# Patient Record
Sex: MALE | Race: WHITE | NOT HISPANIC OR LATINO | URBAN - NONMETROPOLITAN AREA
[De-identification: names, ages, dates, MRNs, and addresses within clinical notes are randomized per-mention and may not be internally consistent; named-entity substitution may affect disease eponyms.]

---

## 2017-04-10 LAB — HCV AB SER-ACNC: NEGATIVE

## 2019-12-31 ENCOUNTER — HISTORICAL (OUTPATIENT)
Dept: ADMINISTRATIVE | Facility: HOSPITAL | Age: 54
End: 2019-12-31

## 2020-01-02 ENCOUNTER — HISTORICAL (OUTPATIENT)
Dept: ADMINISTRATIVE | Facility: HOSPITAL | Age: 55
End: 2020-01-02

## 2020-01-07 ENCOUNTER — HISTORICAL (OUTPATIENT)
Dept: ADMINISTRATIVE | Facility: HOSPITAL | Age: 55
End: 2020-01-07

## 2020-01-08 ENCOUNTER — HISTORICAL (OUTPATIENT)
Dept: ADMINISTRATIVE | Facility: HOSPITAL | Age: 55
End: 2020-01-08

## 2020-01-09 ENCOUNTER — HISTORICAL (OUTPATIENT)
Dept: ADMINISTRATIVE | Facility: HOSPITAL | Age: 55
End: 2020-01-09

## 2020-01-14 ENCOUNTER — HISTORICAL (OUTPATIENT)
Dept: ADMINISTRATIVE | Facility: HOSPITAL | Age: 55
End: 2020-01-14

## 2020-01-21 ENCOUNTER — HISTORICAL (OUTPATIENT)
Dept: ADMINISTRATIVE | Facility: HOSPITAL | Age: 55
End: 2020-01-21

## 2020-01-24 LAB — CRC RECOMMENDATION EXT: NORMAL

## 2020-02-16 ENCOUNTER — HISTORICAL (OUTPATIENT)
Dept: ADMINISTRATIVE | Facility: HOSPITAL | Age: 55
End: 2020-02-16

## 2020-11-28 ENCOUNTER — HISTORICAL (OUTPATIENT)
Dept: ADMINISTRATIVE | Facility: HOSPITAL | Age: 55
End: 2020-11-28

## 2021-01-03 ENCOUNTER — HISTORICAL (OUTPATIENT)
Dept: ADMINISTRATIVE | Facility: HOSPITAL | Age: 56
End: 2021-01-03

## 2021-01-03 LAB
CALCIUM SERPL-MCNC: 9.4 MG/DL (ref 8.5–10.1)
CHLORIDE SERPL-SCNC: 106 MMOL/L (ref 98–107)
CO2 SERPL-SCNC: 27 MMOL/L (ref 21–32)
CREAT SERPL-MCNC: 0.7 MG/DL (ref 0.6–1.3)
GLUCOSE SERPL-MCNC: 102 MG/DL (ref 74–106)
POTASSIUM SERPL-SCNC: 3.4 MMOL/L (ref 3.5–5.1)
SODIUM SERPL-SCNC: 144 MEQ/L (ref 131–145)

## 2021-11-01 LAB
ALBUMIN SERPL-MCNC: 4.1 G/DL (ref 3.4–5)
ALBUMIN/GLOB SERPL: 1.6 {RATIO}
ALP SERPL-CCNC: 63 U/L (ref 50–144)
ALT SERPL-CCNC: 16 U/L (ref 1–45)
AMYLASE SERPL-CCNC: 62 U/L (ref 30–110)
ANION GAP SERPL CALC-SCNC: 6 MMOL/L (ref 7–16)
AST SERPL-CCNC: 22 U/L (ref 17–59)
BASOPHILS # BLD AUTO: 0.08 10*3/UL (ref 0.01–0.08)
BASOPHILS NFR BLD AUTO: 1.2 % (ref 0.1–1.2)
BILIRUB SERPL-MCNC: 0.21 MG/DL (ref 0–1)
BUN SERPL-MCNC: 6 MG/DL (ref 7–20)
CALCIUM SERPL-MCNC: 9.5 MG/DL (ref 8.4–10.2)
CHLORIDE SERPL-SCNC: 114 MMOL/L (ref 94–110)
CHOLEST SERPL-MCNC: 132 MG/DL (ref 0–200)
CO2 SERPL-SCNC: 23 MMOL/L (ref 21–32)
CREAT SERPL-MCNC: 0.76 MG/DL (ref 0.66–1.25)
CREAT/UREA NIT SERPL: 7.9 (ref 12–20)
DEPRECATED CALCIDIOL+CALCIFEROL SERPL-MC: 38 NG/ML (ref 30–100)
EOSINOPHIL # BLD AUTO: 0.33 10*3/UL (ref 0.04–0.54)
EOSINOPHIL NFR BLD AUTO: 5.1 % (ref 0.7–7)
ERYTHROCYTE [DISTWIDTH] IN BLOOD BY AUTOMATED COUNT: 13.3 % (ref 11.6–14.4)
EST CREAT CLEARANCE SER (OHS): 155.4 ML/MIN
GLOBULIN SER-MCNC: 2.6 G/DL (ref 2–3.9)
GLUCOSE SERPL-MCNC: 90 MGM./DL (ref 70–115)
HCT VFR BLD AUTO: 35.7 % (ref 36–52)
HDLC SERPL-MCNC: 36 MG/DL (ref 40–60)
HGB BLD-MCNC: 12.4 G/DL (ref 13–18)
IMM GRANULOCYTES # BLD AUTO: 0.05 10*3/UL (ref 0–0.03)
IMM GRANULOCYTES NFR BLD AUTO: 0.8 % (ref 0–0.5)
LDLC SERPL CALC-MCNC: 63.8 MG/DL (ref 30–100)
LIPASE SERPL-CCNC: 295 U/L (ref 23–300)
LYMPHOCYTES # BLD AUTO: 2.04 10*3/UL (ref 1.32–3.57)
LYMPHOCYTES NFR BLD AUTO: 31.6 % (ref 20–55)
MCH RBC QN AUTO: 31.7 PG (ref 27–34)
MCHC RBC AUTO-ENTMCNC: 34.7 G/DL (ref 31–37)
MCV RBC AUTO: 91.3 FL (ref 79–99)
MONOCYTES # BLD AUTO: 0.51 10*3/UL (ref 0.3–0.82)
MONOCYTES NFR BLD AUTO: 7.9 % (ref 4.7–12.5)
NEUTROPHILS # BLD AUTO: 3.44 10*3/UL (ref 1.78–5.38)
NEUTROPHILS NFR BLD AUTO: 53.4 % (ref 37–73)
PLATELET # BLD AUTO: 268 10*3/UL (ref 140–371)
PMV BLD AUTO: 10.4 FL (ref 9.4–12.4)
POTASSIUM SERPL-SCNC: 3.6 MMOL/L (ref 3.5–5.1)
PROT SERPL-MCNC: 6.7 G/DL (ref 6.3–8.2)
RBC # BLD AUTO: 3.91 10*6/UL (ref 4–6)
SODIUM SERPL-SCNC: 143 MMOL/L (ref 135–145)
TRIGL SERPL-MCNC: 146 MG/DL (ref 30–200)
WBC # SPEC AUTO: 6.5 10*3/UL (ref 4–11.5)

## 2022-04-07 ENCOUNTER — HISTORICAL (OUTPATIENT)
Dept: ADMINISTRATIVE | Facility: HOSPITAL | Age: 57
End: 2022-04-07

## 2022-04-24 VITALS
DIASTOLIC BLOOD PRESSURE: 70 MMHG | BODY MASS INDEX: 32.64 KG/M2 | SYSTOLIC BLOOD PRESSURE: 126 MMHG | HEIGHT: 70 IN | OXYGEN SATURATION: 97 % | WEIGHT: 228 LBS

## 2022-04-27 ENCOUNTER — HISTORICAL (OUTPATIENT)
Dept: ADMINISTRATIVE | Facility: HOSPITAL | Age: 57
End: 2022-04-27

## 2022-05-01 NOTE — HISTORICAL [facility] CERNER
This is a historical note converted from [employer]. Formatting and pictures may have been removed.  Please reference [employer] for original formatting and attached multimedia. Chief Complaint  1 mth f/u on seizures. last seizure 1 month ago.abdominal pain, f/u ER  History of Present Illness  The patient presents with complaint of severe right lower abdominal pain. By report normal visit. No seizure since last visit. Taking all medication except miralax. Havent taken in several months.? Pain resolved after bowel movement in office.  Review of Systems  Constitutional:No fever, no chills, no sweats, no weakness, no fatigue.  Eye:No recent visual problems, no blurry vision, no visual disturbances.  ENT:No ear pain, no change in hearing, no nasal congestion, no sore throat  Respiratory:No shortness of breath, no cough, no wheezing.  Cardiovascular:No chest pain, no palpitations, no peripheral edema.  Gastrointestinal:Negative except HPI.  Genitourinary:No dysuria, no hematuria.?_?  Immunologic:No recurrent fevers, no malaise  Hematology/lymphatics:No swollen lymph glands.  Musculoskeletal:Negative except HPI.  Integumentary:No rashes, no skin lesions.  Neurologic:Negative except HPI.  Psychiatric:No anxiety, no depression, no sleeping problems?  Physical Exam  Vitals & Measurements  T:?37.0? ?C (Temporal Artery)? HR:?73(Peripheral)? RR:?20? BP:?124/64?  HT:?177.80?cm? WT:?97.970?kg? BMI:?30.99?  Constitutional: General Appearance: healthy-appearing, well-nourished, and well-developed. Level of Distress: NAD. Ambulation: ambulating normally.  ?   Psychiatric: Insight: good judgement. Mental Status: normal mood and affect and active and alert. Orientation: to time, place, and person. Memory: recent memory normal and remote memory normal.  ?   Head: Head: normocephalic and with evidence of injury (chronic).  ?   Eyes: Lids and Conjunctivae: non-injected and no discharge. Pupils: PERRLA. EOM: EOMI.  ?   ENMT: Ears: no lesions on  external ear, EACs clear, and TMs clear. Hearing: no hearing loss and Conversational Hearing Normal. Nose: no lesions on external nose, septal deviation, sinus tenderness, or nasal discharge and nares patent and nasal passages clear. Lips, Teeth, and Gums: no mouth or lip ulcers or bleeding gums and normal dentition. Oropharynx: no erythema or exudates and moist mucous membranes and tonsils not enlarged.  ?   Neck: Neck: supple, FROM, trachea midline, and no masses. Lymph Nodes: no cervical LAD, supraclavicular LAD, axillary LAD, or inguinal LAD. Thyroid: no enlargement or nodules and non-tender.  ?   Lungs: Percussion: no dullness, flatness, or hyperresonance. Auscultation: no wheezing, rales/crackles, or rhonchi and breath sounds normal, good air movement, and CTA except as noted.  ?   Cardiovascular: Heart Auscultation: normal S1 and S2 and RRR and no murmurs. Neck vessels: no carotid bruits. Pulses strong / equal.  ?  Abdomen: Bowel Sounds: hyperactive. Inspection and Palpation: slightly distended with increased tympany and right lower fullness with slight tenderness, guarding, masses, rebound tenderness, or CVA tenderness and soft and non-distended. Liver: non-tender and no hepatomegaly. Spleen: non-tender and no splenomegaly. Hernia: none palpable.  ?  Musculoskeletal:: Motor Strength and Tone: abnormal motor strength and hypertonicity (right lateral). Joints, Bones, and Muscles: no contractures, tenderness, or bony abnormalities and malalignment and limited ROM (right hemiparesis). Extremities: no cyanosis, edema, varicosities, or palpable cord. Lumbar / Lumbosacral Spine lumbar paravertebral normal.  ?  Neurologic: Gait and Station: wide based, limping gait and station. Sensation: grossly intact. Reflexes: DTRs 2+ bilaterally throughout. Coordination and Cerebellum: no tremor.  ?  Skin: Inspection and palpation: no rash, lesions, ulcer, induration, nodules, jaundice, or abnormal nevi and good turgor. Nails:  normal.  Assessment/Plan  1.?Constipation in male ? K59.00  2.?Abdominal pain, RLQ ? R10.31  3.?Allergic rhinitis ? J30.9  4.?Essential hypertension ? I10  5.?Gastroenteritis ? K52.9  6.?Hemiplegia and/or hemiparesis following stroke ? I69.359  7.?Hyperlipidemia ? E78.5  8.?Chronic constipation ? K59.09  9.?Nicotine dependence ? F17.200  10.?Seizure disorder ? R56.9  Orders:  polyethylene glycol 3350 WITH ELECTROLYTES, See Instructions, one capful daily., # 1 EA, 0 Refill(s), Pharmacy: [email or web address], 177.8, cm, Height/Length Dosing, 01/04/21 9:14:00 CST, 97.97, kg, Weight Dosing, 01/04/21 9:14:00 CST  Clinic Follow up, *Est. 02/01/21 3:00:00 CST, Order for future visit, Constipation in male  Abdominal pain, RLQ  Allergic rhinitis  Essential hypertension  Gastroenteritis  Hemiplegia and/or hemiparesis following stroke  Hyperlipidemia  Chronic constipation  Ni...  Office/Outpatient Visit Level 3 Established 66335-02 PC, Constipation in male  Abdominal pain, RLQ  Allergic rhinitis  Essential hypertension  Gastroenteritis  Hemiplegia and/or hemiparesis following stroke  Hyperlipidemia  Chronic constipation  Nicotine dependence  Seizure disorder, JANATALIE Mclean Me...  Return in one month for follow up with abdominal pain with daily miralax. May need to increase. Will notify with lab results. Reviewed lab and diagnostic from ER visit.  Referrals  Clinic Follow up, *Est. 02/01/21 3:00:00 CST, Order for future visit, Constipation in male  Abdominal pain, RLQ  Allergic rhinitis  Essential hypertension  Gastroenteritis  Hemiplegia and/or hemiparesis following stroke  Hyperlipidemia  Chronic constipation  Ni...  Clinic Follow up, *Est. 02/01/21 3:00:00 CST, Order for future visit, Constipation in male  Abdominal pain, RLQ  Allergic rhinitis  Essential hypertension  Gastroenteritis  Hemiplegia and/or hemiparesis following stroke  Hyperlipidemia  Chronic constipation  Ni...   Problem  List/Past Medical History  Ongoing  Alcohol dependence  Allergic rhinitis  Arthropathy  Chronic constipation  COPD - Chronic obstructive pulmonary disease  Coronary atherosclerosis  CVA - Cerebrovascular accident  Depressive disorder  Erosive esophagitis  Essential hypertension  Gastroenteritis  GERD - Gastro-esophageal reflux disease  Hemiplegia and/or hemiparesis following stroke  History of iron deficiency  Hyperlipidemia  Hypokalemia  Impaired fasting glycaemia  Long-term drug therapy  Mild chronic obstructive pulmonary disease  Mixed anxiety and depressive disorder  Neuralgia  Nicotine dependence  Obstructive sleep apnea syndrome  Seizure disorder  Viral hepatitis C  Vitamin B12 deficiency  Vitamin D deficiency  Historical  Abdominal pain  Chronic gastritis  Procedure/Surgical History  Laparoscopic cholecystectomy (02/06/2020)  Colonoscopy (01/24/2020)  EGD w/Bx (01/24/2020)  crainotomy (01/01/1994)   Medications  albuterol 90 mcg/inh inhalation aerosol, See Instructions  amlodipine-benazepril 10 mg-20 mg oral capsule, 1 cap(s), Oral, Daily, 1 refills  Anoro Ellipta 62.5 mcg-25 mcg/inh inhalation powder, 1 puff(s), INH, Daily, 1 refills  Astelin 137 mcg/inh nasal spray, 1 spray(s), Nasal, BID, 2 refills  B-12 1000 mcg sublingual tablet, See Instructions  celecoxib 200 mg oral capsule, See Instructions  cetirizine 10 mg oral tablet, 10 mg= 1 tab(s), Oral, Daily, 1 refills  clopidogrel 75 mg oral tablet, See Instructions  Dexilant 60 mg oral delayed release capsule, See Instructions  escitalopram 20 mg oral tablet, See Instructions  fluticasone 50 mcg/inh nasal spray, See Instructions  gabapentin 400 mg oral capsule, See Instructions  levetiracetam 500 mg oral tablet, 1000 mg= 2 tab(s), Oral, BID, 1 refills  polyethylene glycol 3350 with electrolytes oral powder for reconstitution, See Instructions  rosuvastatin 10 mg oral tablet, See Instructions  Vascepa 1 g oral capsule, 2 gm= 2 cap(s), Oral, BID, 1  refills  Vitamin B Complex with C and Folic Acid oral capsule, 1 cap(s), Oral, Daily  Vitamin B12 with Folic Acid sublingual tablet, 1 tab(s), Oral, Daily  Allergies  No Known Allergies  No Known Medication Allergies  Social History  Abuse/Neglect  No, No, Yes, 01/04/2021  Alcohol  Current, Beer, Daily, Alcohol use interferes with work or home: No. Others hurt by drinking: No. Household alcohol concerns: No., 12/01/2020  Employment/School  Disabled, Highest education level: High school., 01/15/2020  Exercise  Exercise duration: 15. Exercise frequency: Daily. Exercise type: Walking., 01/15/2020  Home/Environment  Lives with Father, Mother. Living situation: Home with assistance. TV/Computer concerns: No. Mobile home, 01/15/2020  Nutrition/Health  Regular, Good, 01/15/2020  Sexual  Sexually active: No. Number of current partners 0. Sexual orientation: Straight or heterosexual. Gender Identity Identifies as male. No, 01/15/2020  Spiritual/Cultural  Alevism, No, 01/15/2020  Substance Use  Never, Drug use interferes with work/home: No. Household substance abuse concerns: No., 12/01/2020  Tobacco  10 or more cigarettes (1/2 pack or more)/day in last 30 days, No, 17 Years (Age started)., 01/04/2021  Family History  Family history is negative  Immunizations  Vaccine Date Status   influenza virus vaccine, inactivated 10/21/2020 Given   influenza virus vaccine, inactivated 09/24/2019 Recorded   influenza virus vaccine, inactivated 11/07/2018 Recorded   zoster vaccine live 02/06/2018 Recorded   tetanus/diphtheria/pertussis, acel(Tdap) 02/06/2018 Recorded   pneumococcal 13-valent conjugate vaccine 11/06/2017 Recorded   influenza virus vaccine, inactivated 11/06/2017 Recorded   influenza virus vaccine, inactivated 09/28/2016 Recorded   influenza virus vaccine, inactivated 11/10/2015 Recorded   influenza virus vaccine, inactivated 11/04/2014 Recorded   influenza virus vaccine, inactivated 10/01/2013 Recorded   influenza virus  vaccine, inactivated 11/01/2011 Recorded   influenza virus vaccine, inactivated 09/27/2010 Recorded   Health Maintenance  Health Maintenance  ???Pending?(in the next year)  ??? ??OverDue  ??? ? ? ?Coronary Artery Disease Maintenance-BMP due??02/10/17??and every 1??year(s)  ??? ? ? ?Smoking Cessation due??01/01/21??Variable frequency  ??? ??Due?  ??? ? ? ?Alcohol Misuse Screening due??01/02/21??and every 1??year(s)  ??? ? ? ?ADL Screening due??01/04/21??and every 1??year(s)  ??? ? ? ?Aspirin Therapy for CVD Prevention due??01/04/21??and every 1??year(s)  ??? ? ? ?COPD Maintenance-Pulmonary Rehab Education due??01/04/21??and every 1??year(s)  ??? ? ? ?COPD Maintenance-Spirometry due??01/04/21??Unknown Frequency  ??? ? ? ?COPD Management-Oxygen Assessment due??01/04/21??and every 1??year(s)  ??? ? ? ?Coronary Artery Disease Maintenance-Electrocardiogram due??01/04/21??Variable frequency  ??? ? ? ?Hypertension Maintenance-Medication Prescribed due??01/04/21??and every 1??year(s)  ??? ? ? ?Hypertension Management-Education due??01/04/21??and every 1??year(s)  ??? ? ? ?Medicare Annual Wellness Exam due??01/04/21??and every 1??year(s)  ??? ? ? ?Zoster Vaccine due??01/04/21??Unknown Frequency  ??? ??Due In Future?  ??? ? ? ?Hypertension Management-BMP not due until??02/15/21??and every 1??year(s)  ??? ? ? ?Depression Screening not due until??02/25/21??and every 1??year(s)  ??? ? ? ?COPD Management-COPD Medications Prescribed not due until??09/03/21??and every 1??year(s)  ??? ? ? ?Influenza Vaccine not due until??10/01/21??and every 1??day(s)  ??? ? ? ?Obesity Screening not due until??01/01/22??and every 1??year(s)  ???Satisfied?(in the past 1 year)  ??? ??Satisfied?  ??? ? ? ?Blood Pressure Screening on??01/04/21.??Satisfied by Nola Sotelo  ??? ? ? ?Body Mass Index Check on??01/04/21.??Satisfied by Nola Sotelo  ??? ? ? ?COPD Management-COPD Medications Prescribed on??09/03/20.??Satisfied by Vanessa Kincaid NP  GRZEGORZ  ??? ? ? ?Colorectal Screening on??01/24/20.??Satisfied by Roxann Quarles  ??? ? ? ?Coronary Artery Disease Maintenance-Antiplatelet Agent Prescribed on??09/15/20.??Satisfied by Vanessa Kincaid NP  ??? ? ? ?Coronary Artery Disease Maintenance-Lipid Lowering Therapy on??09/03/20.??Satisfied by Vanessa Kincaid NP  ??? ? ? ?Depression Screening on??02/26/20.??Satisfied by Nina Hooks LPN  ??? ? ? ?Hypertension Management-Blood Pressure on??01/04/21.??Satisfied by Nola Sotelo  ??? ? ? ?Influenza Vaccine on??10/21/20.??Satisfied by Naheed Rodriguez  ??? ? ? ?Obesity Screening on??01/04/21.??Satisfied by Nola Sotelo  ?

## 2023-01-25 DIAGNOSIS — G40.409 OTHER GENERALIZED EPILEPSY, NOT INTRACTABLE, WITHOUT STATUS EPILEPTICUS: Primary | ICD-10-CM

## 2023-01-25 RX ORDER — LEVETIRACETAM 500 MG/1
2 TABLET ORAL 2 TIMES DAILY
COMMUNITY
Start: 2022-10-31 | End: 2023-01-25 | Stop reason: SDUPTHER

## 2023-01-25 RX ORDER — LEVETIRACETAM 500 MG/1
1000 TABLET ORAL 2 TIMES DAILY
Qty: 120 TABLET | Refills: 3 | Status: SHIPPED | OUTPATIENT
Start: 2023-01-25 | End: 2023-05-01 | Stop reason: SDUPTHER

## 2023-01-30 ENCOUNTER — TELEPHONE (OUTPATIENT)
Dept: FAMILY MEDICINE | Facility: CLINIC | Age: 58
End: 2023-01-30
Payer: MEDICARE

## 2023-01-30 NOTE — TELEPHONE ENCOUNTER
----- Message from Pebbles Garcia sent at 1/30/2023  9:09 AM CST -----  Regarding: still no med refill      Dad called to say that [facility] still has not gotten the pts Anora called in.    Please advise his father

## 2023-03-06 DIAGNOSIS — E78.5 HYPERLIPIDEMIA, UNSPECIFIED HYPERLIPIDEMIA TYPE: ICD-10-CM

## 2023-03-06 DIAGNOSIS — J30.2 SEASONAL ALLERGIES: ICD-10-CM

## 2023-03-06 DIAGNOSIS — R52 PAIN: Primary | ICD-10-CM

## 2023-03-06 RX ORDER — CETIRIZINE HYDROCHLORIDE 10 MG/1
TABLET ORAL
Qty: 90 TABLET | Refills: 0 | Status: SHIPPED | OUTPATIENT
Start: 2023-03-06 | End: 2023-05-01 | Stop reason: SDUPTHER

## 2023-03-06 RX ORDER — ICOSAPENT ETHYL 1000 MG/1
CAPSULE ORAL
Qty: 360 CAPSULE | Refills: 0 | Status: SHIPPED | OUTPATIENT
Start: 2023-03-06 | End: 2023-05-01 | Stop reason: SDUPTHER

## 2023-03-06 RX ORDER — CETIRIZINE HYDROCHLORIDE 10 MG/1
TABLET ORAL
COMMUNITY
Start: 2022-03-02 | End: 2023-03-06 | Stop reason: SDUPTHER

## 2023-03-06 RX ORDER — CELECOXIB 200 MG/1
CAPSULE ORAL
Qty: 30 CAPSULE | Refills: 2 | Status: SHIPPED | OUTPATIENT
Start: 2023-03-06 | End: 2023-05-01 | Stop reason: SDUPTHER

## 2023-03-06 RX ORDER — ICOSAPENT ETHYL 1000 MG/1
CAPSULE ORAL
COMMUNITY
Start: 2022-06-29 | End: 2023-03-06 | Stop reason: SDUPTHER

## 2023-03-06 NOTE — TELEPHONE ENCOUNTER
----- Message from Lucas Feliciano MA sent at 3/6/2023  8:45 AM CST -----  Regarding: Med Refill  Vanessa    Vascepa 1gm       Cetirizine  10mg         [facility] [address]

## 2023-03-13 DIAGNOSIS — J30.9 ALLERGIC RHINITIS, UNSPECIFIED SEASONALITY, UNSPECIFIED TRIGGER: ICD-10-CM

## 2023-03-13 DIAGNOSIS — K59.00 CONSTIPATION, UNSPECIFIED CONSTIPATION TYPE: ICD-10-CM

## 2023-03-13 DIAGNOSIS — F32.A DEPRESSION, UNSPECIFIED DEPRESSION TYPE: Primary | ICD-10-CM

## 2023-03-13 RX ORDER — FLUTICASONE PROPIONATE 50 MCG
SPRAY, SUSPENSION (ML) NASAL
Qty: 48 G | Refills: 1 | Status: SHIPPED | OUTPATIENT
Start: 2023-03-13

## 2023-03-13 RX ORDER — ESCITALOPRAM OXALATE 20 MG/1
TABLET ORAL
Qty: 90 TABLET | Refills: 1 | Status: SHIPPED | OUTPATIENT
Start: 2023-03-13 | End: 2023-05-01 | Stop reason: SDUPTHER

## 2023-03-13 NOTE — TELEPHONE ENCOUNTER
----- Message from Pebbles Garcia sent at 3/13/2023  2:23 PM CDT -----  Regarding: Med refill      Dad called to get patients Veronica Delgadomcg refilled at [facility]

## 2023-03-22 ENCOUNTER — TELEPHONE (OUTPATIENT)
Dept: FAMILY MEDICINE | Facility: CLINIC | Age: 58
End: 2023-03-22
Payer: MEDICARE

## 2023-03-22 DIAGNOSIS — M79.2 NERVE PAIN: ICD-10-CM

## 2023-03-22 RX ORDER — GABAPENTIN 400 MG/1
CAPSULE ORAL
Qty: 90 CAPSULE | Refills: 1 | Status: SHIPPED | OUTPATIENT
Start: 2023-03-22 | End: 2023-05-01 | Stop reason: SDUPTHER

## 2023-03-22 NOTE — TELEPHONE ENCOUNTER
----- Message from Lucas Feliciano MA sent at 3/22/2023  9:23 AM CDT -----  Regarding: Med Refill  Vanessa        Gabapentin 400 mg      [facility]

## 2023-04-03 DIAGNOSIS — E87.6 HYPOKALEMIA: Primary | ICD-10-CM

## 2023-04-03 RX ORDER — POTASSIUM CHLORIDE 750 MG/1
10 TABLET, EXTENDED RELEASE ORAL DAILY
Qty: 90 TABLET | Refills: 0 | Status: SHIPPED | OUTPATIENT
Start: 2023-04-03 | End: 2023-05-01 | Stop reason: SDUPTHER

## 2023-04-03 RX ORDER — POTASSIUM CHLORIDE 750 MG/1
1 TABLET, EXTENDED RELEASE ORAL DAILY
COMMUNITY
Start: 2023-01-11 | End: 2023-04-03 | Stop reason: SDUPTHER

## 2023-04-17 PROBLEM — K86.81: Status: ACTIVE | Noted: 2023-04-17

## 2023-04-17 PROBLEM — K22.70 BARRETT'S ESOPHAGUS WITHOUT DYSPLASIA: Status: ACTIVE | Noted: 2023-04-17

## 2023-04-24 PROCEDURE — 85025 COMPLETE CBC W/AUTO DIFF WBC: CPT | Performed by: NURSE PRACTITIONER

## 2023-04-24 PROCEDURE — 80053 COMPREHEN METABOLIC PANEL: CPT | Performed by: NURSE PRACTITIONER

## 2023-04-24 PROCEDURE — 80177 DRUG SCRN QUAN LEVETIRACETAM: CPT | Mod: 90 | Performed by: NURSE PRACTITIONER

## 2023-04-26 RX ORDER — ROSUVASTATIN CALCIUM 10 MG/1
TABLET, COATED ORAL
COMMUNITY
Start: 2022-01-18 | End: 2023-05-01 | Stop reason: SDUPTHER

## 2023-04-27 ENCOUNTER — DOCUMENTATION ONLY (OUTPATIENT)
Dept: FAMILY MEDICINE | Facility: CLINIC | Age: 58
End: 2023-04-27
Payer: MEDICARE

## 2023-05-01 ENCOUNTER — OFFICE VISIT (OUTPATIENT)
Dept: FAMILY MEDICINE | Facility: CLINIC | Age: 58
End: 2023-05-01
Payer: MEDICARE

## 2023-05-01 DIAGNOSIS — I10 BENIGN ESSENTIAL HTN: ICD-10-CM

## 2023-05-01 DIAGNOSIS — Z87.820 HISTORY OF TRAUMATIC BRAIN INJURY: ICD-10-CM

## 2023-05-01 DIAGNOSIS — J44.9 MILD CHRONIC OBSTRUCTIVE PULMONARY DISEASE: ICD-10-CM

## 2023-05-01 DIAGNOSIS — K59.00 CONSTIPATION, UNSPECIFIED CONSTIPATION TYPE: ICD-10-CM

## 2023-05-01 DIAGNOSIS — G40.909 SEIZURE DISORDER: ICD-10-CM

## 2023-05-01 DIAGNOSIS — F32.A DEPRESSION, UNSPECIFIED DEPRESSION TYPE: ICD-10-CM

## 2023-05-01 DIAGNOSIS — E87.6 HYPOKALEMIA: ICD-10-CM

## 2023-05-01 DIAGNOSIS — Z81.1 FAMILY HISTORY OF ALCOHOL ABUSE: ICD-10-CM

## 2023-05-01 DIAGNOSIS — E78.5 HYPERLIPIDEMIA, UNSPECIFIED HYPERLIPIDEMIA TYPE: ICD-10-CM

## 2023-05-01 DIAGNOSIS — K59.09 CHRONIC CONSTIPATION: ICD-10-CM

## 2023-05-01 DIAGNOSIS — M79.2 NERVE PAIN: ICD-10-CM

## 2023-05-01 DIAGNOSIS — J30.2 SEASONAL ALLERGIES: ICD-10-CM

## 2023-05-01 DIAGNOSIS — G81.90 HEMIPLEGIA, UNSPECIFIED ETIOLOGY, UNSPECIFIED HEMIPLEGIA TYPE, UNSPECIFIED LATERALITY: ICD-10-CM

## 2023-05-01 DIAGNOSIS — R52 PAIN: ICD-10-CM

## 2023-05-01 DIAGNOSIS — E78.2 MIXED HYPERLIPIDEMIA: Primary | ICD-10-CM

## 2023-05-01 DIAGNOSIS — Z86.39 HISTORY OF NON ANEMIC VITAMIN B12 DEFICIENCY: ICD-10-CM

## 2023-05-01 DIAGNOSIS — G40.409 OTHER GENERALIZED EPILEPSY, NOT INTRACTABLE, WITHOUT STATUS EPILEPTICUS: ICD-10-CM

## 2023-05-01 PROCEDURE — 99214 OFFICE O/P EST MOD 30 MIN: CPT | Mod: ,,, | Performed by: NURSE PRACTITIONER

## 2023-05-01 PROCEDURE — 99214 PR OFFICE/OUTPT VISIT, EST, LEVL IV, 30-39 MIN: ICD-10-PCS | Mod: ,,, | Performed by: NURSE PRACTITIONER

## 2023-05-01 RX ORDER — LEVETIRACETAM 500 MG/1
1000 TABLET ORAL 2 TIMES DAILY
Qty: 120 TABLET | Refills: 3 | Status: SHIPPED | OUTPATIENT
Start: 2023-05-01 | End: 2023-05-03

## 2023-05-01 RX ORDER — POTASSIUM CHLORIDE 750 MG/1
10 TABLET, EXTENDED RELEASE ORAL DAILY
Qty: 90 TABLET | Refills: 0 | Status: SHIPPED | OUTPATIENT
Start: 2023-05-01 | End: 2023-07-05 | Stop reason: SDUPTHER

## 2023-05-01 RX ORDER — ROSUVASTATIN CALCIUM 10 MG/1
TABLET, COATED ORAL
Qty: 90 TABLET | Refills: 3 | Status: SHIPPED | OUTPATIENT
Start: 2023-05-01 | End: 2023-06-13 | Stop reason: SDUPTHER

## 2023-05-01 RX ORDER — ICOSAPENT ETHYL 1000 MG/1
CAPSULE ORAL
Qty: 360 CAPSULE | Refills: 0 | Status: SHIPPED | OUTPATIENT
Start: 2023-05-01 | End: 2023-09-05 | Stop reason: SDUPTHER

## 2023-05-01 RX ORDER — LANOLIN ALCOHOL/MO/W.PET/CERES
1000 CREAM (GRAM) TOPICAL DAILY
Qty: 90 TABLET | Refills: 3 | Status: SHIPPED | OUTPATIENT
Start: 2023-05-01 | End: 2023-12-11 | Stop reason: SDUPTHER

## 2023-05-01 RX ORDER — ESCITALOPRAM OXALATE 20 MG/1
TABLET ORAL
Qty: 90 TABLET | Refills: 1 | Status: SHIPPED | OUTPATIENT
Start: 2023-05-01 | End: 2023-11-01 | Stop reason: SDUPTHER

## 2023-05-01 RX ORDER — CELECOXIB 200 MG/1
CAPSULE ORAL
Qty: 90 CAPSULE | Refills: 3 | Status: SHIPPED | OUTPATIENT
Start: 2023-05-01 | End: 2023-11-01 | Stop reason: SDUPTHER

## 2023-05-01 RX ORDER — DEXLANSOPRAZOLE 60 MG/1
1 CAPSULE, DELAYED RELEASE ORAL DAILY PRN
COMMUNITY
Start: 2021-12-07 | End: 2024-01-30

## 2023-05-01 RX ORDER — LANOLIN ALCOHOL/MO/W.PET/CERES
1 CREAM (GRAM) TOPICAL DAILY
COMMUNITY
Start: 2021-12-07 | End: 2023-05-01 | Stop reason: SDUPTHER

## 2023-05-01 RX ORDER — GABAPENTIN 400 MG/1
CAPSULE ORAL
Qty: 90 CAPSULE | Refills: 1 | Status: SHIPPED | OUTPATIENT
Start: 2023-05-01 | End: 2023-05-22 | Stop reason: SDUPTHER

## 2023-05-01 RX ORDER — CETIRIZINE HYDROCHLORIDE 10 MG/1
TABLET ORAL
Qty: 90 TABLET | Refills: 0 | Status: SHIPPED | OUTPATIENT
Start: 2023-05-01 | End: 2023-09-05 | Stop reason: SDUPTHER

## 2023-05-01 RX ORDER — PANCRELIPASE 36000; 180000; 114000 [USP'U]/1; [USP'U]/1; [USP'U]/1
3 CAPSULE, DELAYED RELEASE PELLETS ORAL DAILY
COMMUNITY
Start: 2023-04-11

## 2023-05-01 NOTE — PROGRESS NOTES
Subjective:       Patient ID: Phan Saenz is a 58 y.o. male.    Chief Complaint: Review labs    The patient returns with his father for review of lab and chronic condition management.  He is taking his medications and feels that they are working well he is exercising most every afternoon 2-3 hours and feels that it is some help with both his ability to move and also it helps with the sadness.  He is also taking his Celebrex most every day and the Dexilant but not experiencing the abdominal pain as he had in the past and the Linzess does help with the low-dose.  No seizure since his last visit no falls he has not had any alcohol in 2 years still smoking a pack a day but not willing to stop yet.    Review of Systems   Constitutional:  Negative for activity change and appetite change.   HENT:  Negative for nasal congestion, trouble swallowing and voice change.    Eyes: Negative.  Negative for visual disturbance.   Respiratory: Negative.  Negative for chest tightness, shortness of breath and wheezing.    Cardiovascular:  Negative for chest pain, palpitations and leg swelling.   Gastrointestinal:  Negative for blood in stool, change in bowel habit, constipation and change in bowel habit.   Endocrine: Negative for cold intolerance, heat intolerance and polyuria.   Genitourinary:  Negative for bladder incontinence, difficulty urinating, flank pain and frequency.   Musculoskeletal:  Positive for arthralgias.   Allergic/Immunologic: Negative for environmental allergies and food allergies.   Neurological:  Positive for weakness (right sided). Negative for vertigo, tremors, seizures, syncope, light-headedness, headaches, coordination difficulties and coordination difficulties.   Hematological:  Negative for adenopathy. Does not bruise/bleed easily.   Psychiatric/Behavioral:  Negative for agitation, sleep disturbance and suicidal ideas.        Objective:      There were no vitals filed for this visit.    Physical  Exam  Vitals reviewed.   Constitutional:       General: He is not in acute distress.     Appearance: He is not ill-appearing.   HENT:      Head: Normocephalic and atraumatic.      Nose: Nose normal.      Mouth/Throat:      Mouth: Mucous membranes are moist.      Pharynx: Oropharynx is clear.   Eyes:      General: No scleral icterus.     Extraocular Movements: Extraocular movements intact.      Conjunctiva/sclera: Conjunctivae normal.      Pupils: Pupils are equal, round, and reactive to light.   Neck:      Vascular: No carotid bruit.   Cardiovascular:      Rate and Rhythm: Normal rate and regular rhythm.      Pulses: Normal pulses.      Heart sounds: Normal heart sounds. No murmur heard.    No friction rub. No gallop.   Pulmonary:      Effort: Pulmonary effort is normal. No respiratory distress.      Breath sounds: Normal breath sounds. No wheezing, rhonchi or rales.   Abdominal:      General: Bowel sounds are normal.      Palpations: Abdomen is soft.   Musculoskeletal:      Cervical back: Normal range of motion and neck supple.      Comments: Right sided weakness worse to upper extremities.    Skin:     General: Skin is warm.   Neurological:      Mental Status: He is alert and oriented to person, place, and time.   Psychiatric:         Mood and Affect: Mood normal.         Thought Content: Thought content normal.       Assessment/Plan:     1. History of non anemic vitamin B12 deficiency  -     cyanocobalamin (VITAMIN B-12) 1000 MCG tablet; Take 1 tablet (1,000 mcg total) by mouth Daily.  Dispense: 90 tablet; Refill: 3    2. Mixed hyperlipidemia  Assessment & Plan:  The current medical regimen is effective;  continue present plan and medications.    Orders:  -     rosuvastatin (CRESTOR) 10 MG tablet; See Instructions, TAKE ONE(1) TAB BY MOUTH EVERY NIGHT AT BEDTIME FOR CHOLESTEROL, # 90 tab(s), 1 Refill(s), Pharmacy: [facility], 177.5, cm, Height/Length Dosing, 01/18/23 9:50:00 CST, 92.98, kg, Weight  Dosing, 01/18/23 9:50:00 CST  Dispense: 90 tablet; Refill: 3    3. History of traumatic brain injury  Assessment & Plan:  Living arrangements - the patient their family and managing chronic conditions.       4. Seizure disorder  Assessment & Plan:  The current medical regimen is effective;  continue present plan and medications.      5. Chronic constipation  Assessment & Plan:  The current medical regimen is effective;  continue present plan and medications.      6. Benign essential HTN  Assessment & Plan:  The current medical regimen is effective;  continue present plan and medications.      7. Mild chronic obstructive pulmonary disease  Assessment & Plan:  The current medical regimen is effective;  continue present plan and medications. Declines smoking cessation.       8. Family history of alcohol abuse  Assessment & Plan:  No alcohol in 2 years.       9. Hemiplegia, unspecified etiology, unspecified hemiplegia type, unspecified laterality  Assessment & Plan:  Right sided weakness.       10. Pain  -     celecoxib (CELEBREX) 200 MG capsule; TAKE ONE(1) CAP BY MOUTH ONCE A DAY WITH FULL GLASS OF WATER & FOOD FOR PAIN /MAY MIX WITH SOFT FOODS LIKE APPLESAUCE  Dispense: 90 capsule; Refill: 3    11. Seasonal allergies  -     cetirizine (ZYRTEC) 10 MG tablet; See Instructions, TAKE ONE(1) TAB BY MOUTH EVERY NIGHT AT BEDTIME FOR SINUS, ALLERGY, &/OR CONGESTION, # 90 tab(s), 1 Refill(s), Pharmacy: [facility], 177.5, cm, Height/Length Dosing, 06/29/22 9:46:00 CDT, 102.05, kg, Weight Dosing, 06/29/22...  Dispense: 90 tablet; Refill: 0    12. Depression, unspecified depression type  -     EScitalopram oxalate (LEXAPRO) 20 MG tablet; TAKE ONE(1) TAB BY MOUTH EVERY NIGHT AT BEDTIME FOR DEPRESSION, ANXIETY, &/OR IRRITABILITY  Dispense: 90 tablet; Refill: 1    13. Hyperlipidemia, unspecified hyperlipidemia type  -     icosapent ethyL (VASCEPA) 1 gram Cap; See Instructions, TAKE TWO(2) CAPS BY MOUTH TWICE DAILY FOR  CHOLESTEROL ** MUTLIPLE BOTTLES **, # 360 cap(s), 1 Refill(s), Pharmacy: [facility], 177.5, cm, Height/Length Dosing, 06/29/22 9:46:00 CDT, 102.05, kg, Weight Dosing, 06/29/22 9:4...  Dispense: 360 capsule; Refill: 0    14. Other generalized epilepsy, not intractable, without status epilepticus  -     levETIRAcetam (KEPPRA) 500 MG Tab; Take 2 tablets (1,000 mg total) by mouth 2 (two) times a day.  Dispense: 120 tablet; Refill: 3    15. Hypokalemia  -     potassium chloride (KLOR-CON) 10 MEQ TbSR; Take 1 tablet (10 mEq total) by mouth Daily.  Dispense: 90 tablet; Refill: 0    16. Constipation, unspecified constipation type  -     linaCLOtide (LINZESS) 72 mcg Cap capsule; TAKE ONE(1) CAP EVERY DAY 30 MINUTES BEFORE THE 1ST FIRST MEAL OF THE DAY WITH A FULL GLASS OF WATER FOR CONSTIPATION  Dispense: 90 capsule; Refill: 1    17. Nerve pain  -     gabapentin (NEURONTIN) 400 MG capsule; TAKE ONE(1) CAP BY MOUTH 3 TIMES DAILY WITH FOOD AS NEEDED FOR NERVE PAIN  Dispense: 90 capsule; Refill: 1       No follow-ups on file.          Discussed the diagnosis, prognosis, plan of care, chronic nature of and indications for, risks and benefits of treatment for conditions.  Continue all medications as prescribed unless otherwise noted.   Call if patient develops other symptoms or if not better in 2-3 days and sooner if worse. Emphasized the importance of compliance with all recommendations, including medication use and timely follow up. Instructed to return as noted be or sooner if patient develops any other problems or if there are any other additional questions or concerns.

## 2023-05-01 NOTE — ASSESSMENT & PLAN NOTE
The current medical regimen is effective;  continue present plan and medications. Declines smoking cessation.

## 2023-05-03 DIAGNOSIS — G40.409 OTHER GENERALIZED EPILEPSY, NOT INTRACTABLE, WITHOUT STATUS EPILEPTICUS: ICD-10-CM

## 2023-05-03 RX ORDER — LEVETIRACETAM 500 MG/1
TABLET ORAL
Qty: 90 TABLET | Refills: 3 | Status: SHIPPED | OUTPATIENT
Start: 2023-05-03 | End: 2023-07-24 | Stop reason: SDUPTHER

## 2023-05-22 DIAGNOSIS — M79.2 NERVE PAIN: ICD-10-CM

## 2023-05-22 RX ORDER — GABAPENTIN 400 MG/1
CAPSULE ORAL
Qty: 90 CAPSULE | Refills: 1 | Status: SHIPPED | OUTPATIENT
Start: 2023-05-22 | End: 2023-07-24 | Stop reason: SDUPTHER

## 2023-06-13 DIAGNOSIS — E78.2 MIXED HYPERLIPIDEMIA: ICD-10-CM

## 2023-06-13 RX ORDER — ROSUVASTATIN CALCIUM 10 MG/1
TABLET, COATED ORAL
Qty: 90 TABLET | Refills: 3 | Status: SHIPPED | OUTPATIENT
Start: 2023-06-13 | End: 2023-11-01 | Stop reason: SDUPTHER

## 2023-07-05 ENCOUNTER — TELEPHONE (OUTPATIENT)
Dept: FAMILY MEDICINE | Facility: CLINIC | Age: 58
End: 2023-07-05
Payer: MEDICARE

## 2023-07-05 DIAGNOSIS — T78.40XD ALLERGY, SUBSEQUENT ENCOUNTER: ICD-10-CM

## 2023-07-05 DIAGNOSIS — E87.6 HYPOKALEMIA: ICD-10-CM

## 2023-07-05 RX ORDER — POTASSIUM CHLORIDE 750 MG/1
10 TABLET, EXTENDED RELEASE ORAL DAILY
Qty: 90 TABLET | Refills: 1 | Status: SHIPPED | OUTPATIENT
Start: 2023-07-05 | End: 2023-11-01

## 2023-07-05 RX ORDER — AZELASTINE 1 MG/ML
SPRAY, METERED NASAL
Qty: 90 ML | Refills: 0 | Status: SHIPPED | OUTPATIENT
Start: 2023-07-05

## 2023-07-05 NOTE — TELEPHONE ENCOUNTER
----- Message from Pebbles Garcia sent at 7/5/2023 10:19 AM CDT -----  Regarding: Med refill      He needs his Potassium CL ER Tab  10meq refilled         [facility] [address]

## 2023-07-24 ENCOUNTER — TELEPHONE (OUTPATIENT)
Dept: FAMILY MEDICINE | Facility: CLINIC | Age: 58
End: 2023-07-24
Payer: MEDICARE

## 2023-07-24 DIAGNOSIS — G40.409 OTHER GENERALIZED EPILEPSY, NOT INTRACTABLE, WITHOUT STATUS EPILEPTICUS: ICD-10-CM

## 2023-07-24 DIAGNOSIS — M79.2 NERVE PAIN: ICD-10-CM

## 2023-07-24 RX ORDER — LEVETIRACETAM 500 MG/1
TABLET ORAL
Qty: 90 TABLET | Refills: 3 | Status: SHIPPED | OUTPATIENT
Start: 2023-07-24 | End: 2023-10-23 | Stop reason: SDUPTHER

## 2023-07-24 RX ORDER — GABAPENTIN 400 MG/1
CAPSULE ORAL
Qty: 90 CAPSULE | Refills: 1 | Status: SHIPPED | OUTPATIENT
Start: 2023-07-24 | End: 2023-10-02 | Stop reason: SDUPTHER

## 2023-07-24 NOTE — TELEPHONE ENCOUNTER
----- Message from Pebbles Garcia sent at 7/24/2023  2:06 PM CDT -----  Regarding: FW: Med refill  Father states the pharmacy does not have the requested script.  Has this been sent yet?  I did explain that refills are not called in until the nurses have seen all their patients and meds are called out hopefully around noon or before 5:00              ----- Message -----  From: Pebbles Garcia  Sent: 7/24/2023   9:23 AM CDT  To: Codi Mendez Staff  Subject: Med refill                                           He needs a refill on his:  Keppra   500mg  Gabapentin   400mg      [facility] [address]

## 2023-07-24 NOTE — TELEPHONE ENCOUNTER
----- Message from Pebbles Garcia sent at 7/24/2023  9:22 AM CDT -----  Regarding: Med refill      He needs a refill on his:  Keppra   500mg  Gabapentin   400mg      [facility]

## 2023-07-24 NOTE — TELEPHONE ENCOUNTER
Spoke with pharmacy, they had not received the scripts I sent in this morning. I gave a verbal order to refill meds.

## 2023-08-07 ENCOUNTER — TELEPHONE (OUTPATIENT)
Dept: FAMILY MEDICINE | Facility: CLINIC | Age: 58
End: 2023-08-07
Payer: MEDICARE

## 2023-08-07 DIAGNOSIS — I10 BENIGN ESSENTIAL HTN: Primary | ICD-10-CM

## 2023-08-07 RX ORDER — AMLODIPINE AND BENAZEPRIL HYDROCHLORIDE 10; 20 MG/1; MG/1
1 CAPSULE ORAL DAILY
COMMUNITY
Start: 2023-05-09 | End: 2023-08-07 | Stop reason: SDUPTHER

## 2023-08-07 RX ORDER — UMECLIDINIUM BROMIDE AND VILANTEROL TRIFENATATE 62.5; 25 UG/1; UG/1
1 POWDER RESPIRATORY (INHALATION) DAILY
COMMUNITY
Start: 2023-07-28 | End: 2023-11-07

## 2023-08-07 RX ORDER — CLOPIDOGREL BISULFATE 75 MG/1
TABLET ORAL
COMMUNITY
Start: 2023-07-05 | End: 2023-11-01 | Stop reason: SDUPTHER

## 2023-08-07 RX ORDER — AMLODIPINE AND BENAZEPRIL HYDROCHLORIDE 10; 20 MG/1; MG/1
1 CAPSULE ORAL DAILY
Qty: 90 CAPSULE | Refills: 1 | Status: SHIPPED | OUTPATIENT
Start: 2023-08-07 | End: 2023-11-01 | Stop reason: SDUPTHER

## 2023-08-07 NOTE — TELEPHONE ENCOUNTER
----- Message from Pebbles Garcia sent at 8/7/2023 11:37 AM CDT -----  Regarding: Med refill      He needs a refill on his Amlod-Benazp   10-20 mg        [facility] [address]

## 2023-09-04 LAB
BASOPHILS NFR BLD: 0.05 K/UL
BASOPHILS NFR BLD: 1 %
EOSINOPHIL NFR BLD: 0.26 K/UL
EOSINOPHIL NFR BLD: 3 %
ERYTHROCYTE [DISTWIDTH] IN BLOOD BY AUTOMATED COUNT: 13.5 %
EXT NEUT%: 65.4
HEMATOCRIT: 36.9
HEMOGLOBIN: 12.8
IMM GRANULOCYTES # BLD AUTO: 0.06 X10(3)/MCL (ref 0–0.04)
IMM GRANULOCYTES NFR BLD AUTO: 0.7 %
LYMPH #: 1.68 K/UL
LYMPH %: 19.6
MCH RBC QN AUTO: 32.1 PG
MCHC RBC AUTO-ENTMCNC: 34.7 G/DL
MCV RBC AUTO: 92.5 FL
MONO %: 10.7
MONOCYTES NFR BLD: 0.92 %
MPC BLD CALC-MCNC: 9.5 FL
NEUTROPHILS NFR BLD: 5.61 %
PLATELETS: 243
RBC # BLD AUTO: 3.99 10*6/UL
URIC ACID: 10.1
WBC: 8.6

## 2023-09-05 ENCOUNTER — TELEPHONE (OUTPATIENT)
Dept: FAMILY MEDICINE | Facility: CLINIC | Age: 58
End: 2023-09-05
Payer: MEDICARE

## 2023-09-05 DIAGNOSIS — E78.5 HYPERLIPIDEMIA, UNSPECIFIED HYPERLIPIDEMIA TYPE: ICD-10-CM

## 2023-09-05 DIAGNOSIS — K59.00 CONSTIPATION, UNSPECIFIED CONSTIPATION TYPE: ICD-10-CM

## 2023-09-05 DIAGNOSIS — J30.2 SEASONAL ALLERGIES: ICD-10-CM

## 2023-09-05 RX ORDER — ICOSAPENT ETHYL 1000 MG/1
CAPSULE ORAL
Qty: 360 CAPSULE | Refills: 0 | Status: SHIPPED | OUTPATIENT
Start: 2023-09-05 | End: 2023-11-01 | Stop reason: SDUPTHER

## 2023-09-05 RX ORDER — CETIRIZINE HYDROCHLORIDE 10 MG/1
TABLET ORAL
Qty: 90 TABLET | Refills: 0 | Status: SHIPPED | OUTPATIENT
Start: 2023-09-05 | End: 2023-12-04 | Stop reason: SDUPTHER

## 2023-09-05 NOTE — TELEPHONE ENCOUNTER
----- Message from Lucas Feliciano MA sent at 9/5/2023  9:46 AM CDT -----  Vanessa         Vascepa 1 gram      Linzess 72mg      Citrizine 10mg      [facility] [address]

## 2023-09-06 ENCOUNTER — OFFICE VISIT (OUTPATIENT)
Dept: FAMILY MEDICINE | Facility: CLINIC | Age: 58
End: 2023-09-06
Payer: MEDICARE

## 2023-09-06 ENCOUNTER — DOCUMENTATION ONLY (OUTPATIENT)
Dept: FAMILY MEDICINE | Facility: CLINIC | Age: 58
End: 2023-09-06

## 2023-09-06 VITALS
SYSTOLIC BLOOD PRESSURE: 122 MMHG | WEIGHT: 209 LBS | TEMPERATURE: 98 F | HEART RATE: 77 BPM | RESPIRATION RATE: 20 BRPM | OXYGEN SATURATION: 97 % | BODY MASS INDEX: 30.96 KG/M2 | HEIGHT: 69 IN | DIASTOLIC BLOOD PRESSURE: 64 MMHG

## 2023-09-06 DIAGNOSIS — S60.511A ABRASION OF RIGHT HAND, INITIAL ENCOUNTER: ICD-10-CM

## 2023-09-06 DIAGNOSIS — Z81.1 FAMILY HISTORY OF ALCOHOL ABUSE: ICD-10-CM

## 2023-09-06 DIAGNOSIS — Z23 IMMUNIZATION DUE: Primary | ICD-10-CM

## 2023-09-06 DIAGNOSIS — M10.9 GOUTY ARTHRITIS OF LEFT GREAT TOE: ICD-10-CM

## 2023-09-06 PROCEDURE — G0008 FLU VACCINE (QUAD) GREATER THAN OR EQUAL TO 3YO PRESERVATIVE FREE IM: ICD-10-PCS | Mod: ,,, | Performed by: NURSE PRACTITIONER

## 2023-09-06 PROCEDURE — 96372 THER/PROPH/DIAG INJ SC/IM: CPT | Mod: ,,, | Performed by: NURSE PRACTITIONER

## 2023-09-06 PROCEDURE — 90686 IIV4 VACC NO PRSV 0.5 ML IM: CPT | Mod: ,,, | Performed by: NURSE PRACTITIONER

## 2023-09-06 PROCEDURE — 96372 PR INJECTION,THERAP/PROPH/DIAG2ST, IM OR SUBCUT: ICD-10-PCS | Mod: ,,, | Performed by: NURSE PRACTITIONER

## 2023-09-06 PROCEDURE — G0008 ADMIN INFLUENZA VIRUS VAC: HCPCS | Mod: ,,, | Performed by: NURSE PRACTITIONER

## 2023-09-06 PROCEDURE — 99214 PR OFFICE/OUTPT VISIT, EST, LEVL IV, 30-39 MIN: ICD-10-PCS | Mod: ,,, | Performed by: NURSE PRACTITIONER

## 2023-09-06 PROCEDURE — 90686 FLU VACCINE (QUAD) GREATER THAN OR EQUAL TO 3YO PRESERVATIVE FREE IM: ICD-10-PCS | Mod: ,,, | Performed by: NURSE PRACTITIONER

## 2023-09-06 PROCEDURE — 99214 OFFICE O/P EST MOD 30 MIN: CPT | Mod: ,,, | Performed by: NURSE PRACTITIONER

## 2023-09-06 RX ORDER — KETOROLAC TROMETHAMINE 30 MG/ML
60 INJECTION, SOLUTION INTRAMUSCULAR; INTRAVENOUS
Status: COMPLETED | OUTPATIENT
Start: 2023-09-06 | End: 2023-09-06

## 2023-09-06 RX ORDER — COLCHICINE 0.6 MG/1
0.6 TABLET ORAL 2 TIMES DAILY PRN
COMMUNITY
Start: 2023-09-04

## 2023-09-06 RX ORDER — HYDROCODONE BITARTRATE AND ACETAMINOPHEN 5; 325 MG/1; MG/1
1 TABLET ORAL EVERY 4 HOURS
COMMUNITY
Start: 2023-09-04

## 2023-09-06 RX ADMIN — KETOROLAC TROMETHAMINE 60 MG: 30 INJECTION, SOLUTION INTRAMUSCULAR; INTRAVENOUS at 09:09

## 2023-09-06 NOTE — ASSESSMENT & PLAN NOTE
Follow lower purine diet meaning less red meat shellfish avoid alcohol increase water 1. Priority may continue the colchicine twice a day if needed for the pain.  May take the Norco in the evening if needed but pari return in 1 month for follow-up with the arthritis and we will recheck uric acid to know if lifestyle changes or making any improvement.  Continue colchicine up to twice a day if needed for the pain but may add the Norco especially at night if the pain is intense.

## 2023-09-06 NOTE — PROGRESS NOTES
Subjective:       Patient ID: Phan Saenz is a 58 y.o. male.    Chief Complaint: Follow-up (Was taken to ER at [facility] for GOUT to LLE. Was taken on Sunday. Was started on Colchicine and War. Currently still on both meds. )    Severe severe pain to left 1st metatarsal joint that began over the weekend and became acute with swelling and more redness with exquisite tenderness by Monday he went to [facility] and was treated for gout arthritis.  He is taking the colchicine pain medication but still rates pain very high today.  He is taking the other of his medications but finds that he is still needs for ibuprofen in the morning to be able to help with the pain in addition to colchicine.also scratch to right forearm.     Follow-up  Associated symptoms include arthralgias. Pertinent negatives include no chest pain, congestion, headaches or vertigo.     Review of Systems   Constitutional:  Negative for activity change and appetite change.   HENT:  Negative for nasal congestion, trouble swallowing and voice change.    Eyes: Negative.  Negative for visual disturbance.   Respiratory: Negative.  Negative for chest tightness, shortness of breath and wheezing.    Cardiovascular:  Negative for chest pain, palpitations and leg swelling.   Endocrine: Negative for cold intolerance, heat intolerance and polyuria.   Genitourinary:  Negative for bladder incontinence, difficulty urinating, flank pain and frequency.   Musculoskeletal:  Positive for arthralgias.   Allergic/Immunologic: Negative for environmental allergies and food allergies.   Neurological:  Negative for vertigo, tremors, seizures, syncope, light-headedness, headaches, coordination difficulties and coordination difficulties.   Hematological:  Negative for adenopathy. Does not bruise/bleed easily.   Psychiatric/Behavioral:  Negative for agitation, sleep disturbance and suicidal ideas.          Objective:      Vitals:    09/06/23 0839  "  BP: 122/64   Pulse: 77   Resp: 20   Temp: 98.4 °F (36.9 °C)   SpO2: 97%   Weight: 94.8 kg (209 lb)   Height: 5' 9" (1.753 m)       Physical Exam  Vitals and nursing note reviewed.   Constitutional:       General: He is not in acute distress.     Appearance: Normal appearance. He is not ill-appearing.   Cardiovascular:      Rate and Rhythm: Normal rate and regular rhythm.      Pulses: Normal pulses.      Heart sounds: Normal heart sounds. No murmur heard.     No friction rub. No gallop.   Pulmonary:      Effort: No respiratory distress.      Breath sounds: No wheezing, rhonchi or rales.   Musculoskeletal:         General: Swelling (left first metatarsal joint erythema and tenderness with edema. limited flexion of left great toe.) and tenderness present.      Right lower leg: No edema.      Left lower leg: No edema.   Skin:     General: Skin is warm and dry.      Findings: Abrasion present.             Comments: Shallow abrasion   Neurological:      General: No focal deficit present.      Mental Status: He is alert.   Psychiatric:         Mood and Affect: Mood normal.         Behavior: Behavior normal.         Assessment/Plan:     1. Immunization due  -     Influenza - Quadrivalent (PF)    2. Gouty arthritis of left great toe  Assessment & Plan:  Follow lower purine diet meaning less red meat shellfish avoid alcohol increase water 1. Priority may continue the colchicine twice a day if needed for the pain.  May take the Norco in the evening if needed but pari return in 1 month for follow-up with the arthritis and we will recheck uric acid to know if lifestyle changes or making any improvement.  Continue colchicine up to twice a day if needed for the pain but may add the Norco especially at night if the pain is intense.    Orders:  -     ketorolac injection 60 mg    3. Family history of alcohol abuse  Assessment & Plan:  Has had no alcohol in the past 2 years and is following a much more sodas daily.  We will decrease " this and increase the water to at least 60 oz daily.       4. Abrasion of right hand, initial encounter  Assessment & Plan:  cleansed with saline.          Follow up in about 4 weeks (around 10/4/2023).          Discussed the diagnosis, prognosis, plan of care, chronic nature of and indications for, risks and benefits of treatment for conditions.  Continue all medications as prescribed unless otherwise noted.   Call if patient develops other symptoms or if not better in 2-3 days and sooner if worse. Emphasized the importance of compliance with all recommendations, including medication use and timely follow up. Instructed to return as noted be or sooner if patient develops any other problems or if there are any other additional questions or concerns.

## 2023-09-06 NOTE — ASSESSMENT & PLAN NOTE
Has had no alcohol in the past 2 years and is following a much more sodas daily.  We will decrease this and increase the water to at least 60 oz daily.

## 2023-10-02 DIAGNOSIS — M79.2 NERVE PAIN: ICD-10-CM

## 2023-10-02 DIAGNOSIS — E87.6 HYPOKALEMIA: ICD-10-CM

## 2023-10-02 RX ORDER — CLOPIDOGREL BISULFATE 75 MG/1
TABLET ORAL
Qty: 90 TABLET | Refills: 1 | OUTPATIENT
Start: 2023-10-02

## 2023-10-02 RX ORDER — DEXLANSOPRAZOLE 60 MG/1
CAPSULE, DELAYED RELEASE ORAL
Qty: 90 CAPSULE | Refills: 1 | OUTPATIENT
Start: 2023-10-02

## 2023-10-02 RX ORDER — GABAPENTIN 400 MG/1
CAPSULE ORAL
Qty: 90 CAPSULE | Refills: 1 | Status: SHIPPED | OUTPATIENT
Start: 2023-10-02 | End: 2023-11-30 | Stop reason: SDUPTHER

## 2023-10-02 RX ORDER — POTASSIUM CHLORIDE 750 MG/1
TABLET, EXTENDED RELEASE ORAL
Qty: 90 TABLET | Refills: 1 | OUTPATIENT
Start: 2023-10-02

## 2023-10-16 ENCOUNTER — TELEPHONE (OUTPATIENT)
Dept: FAMILY MEDICINE | Facility: CLINIC | Age: 58
End: 2023-10-16
Payer: MEDICARE

## 2023-10-16 NOTE — TELEPHONE ENCOUNTER
----- Message from Pebbles Garcia sent at 4/26/2023  9:32 AM CDT -----  Regarding: Recheck cmp, flp, cbc, tsh, a1c, D, carbamezepine in 6 months at [facility].  Recheck cmp, flp, cbc, tsh, a1c, D, carbamezepine in 6 months at [facility].

## 2023-10-23 ENCOUNTER — TELEPHONE (OUTPATIENT)
Dept: FAMILY MEDICINE | Facility: CLINIC | Age: 58
End: 2023-10-23
Payer: MEDICARE

## 2023-10-23 DIAGNOSIS — G40.409 OTHER GENERALIZED EPILEPSY, NOT INTRACTABLE, WITHOUT STATUS EPILEPTICUS: ICD-10-CM

## 2023-10-23 RX ORDER — LEVETIRACETAM 500 MG/1
TABLET ORAL
Qty: 90 TABLET | Refills: 3 | Status: SHIPPED | OUTPATIENT
Start: 2023-10-23 | End: 2023-10-24

## 2023-10-23 NOTE — TELEPHONE ENCOUNTER
----- Message from Pebbles Garcia sent at 10/23/2023 10:53 AM CDT -----  Regarding: Med refill  He needs a refill on his Keppra 500mg        [facility] [address]

## 2023-10-24 ENCOUNTER — TELEPHONE (OUTPATIENT)
Dept: FAMILY MEDICINE | Facility: CLINIC | Age: 58
End: 2023-10-24
Payer: MEDICARE

## 2023-10-24 DIAGNOSIS — G40.409 OTHER GENERALIZED EPILEPSY, NOT INTRACTABLE, WITHOUT STATUS EPILEPTICUS: ICD-10-CM

## 2023-10-24 RX ORDER — LEVETIRACETAM 500 MG/1
TABLET ORAL
Qty: 270 TABLET | Refills: 0 | Status: SHIPPED | OUTPATIENT
Start: 2023-10-24 | End: 2023-11-01 | Stop reason: SDUPTHER

## 2023-10-24 NOTE — TELEPHONE ENCOUNTER
----- Message from Lesvia Dergoot sent at 10/24/2023  8:24 AM CDT -----  Patient needs refill on Keppra 500 mg called in to [facility] [address]

## 2023-10-24 NOTE — TELEPHONE ENCOUNTER
----- Message from Lesvia Degroot sent at 10/24/2023  8:24 AM CDT -----  Patient needs refill on Keppra 500 mg called in to [facility] [address]

## 2023-10-30 PROCEDURE — 82306 VITAMIN D 25 HYDROXY: CPT | Performed by: NURSE PRACTITIONER

## 2023-10-30 PROCEDURE — 84550 ASSAY OF BLOOD/URIC ACID: CPT | Performed by: NURSE PRACTITIONER

## 2023-10-30 PROCEDURE — 80053 COMPREHEN METABOLIC PANEL: CPT | Performed by: NURSE PRACTITIONER

## 2023-10-30 PROCEDURE — 80061 LIPID PANEL: CPT | Performed by: NURSE PRACTITIONER

## 2023-10-30 PROCEDURE — 80177 DRUG SCRN QUAN LEVETIRACETAM: CPT | Performed by: NURSE PRACTITIONER

## 2023-10-30 PROCEDURE — 83036 HEMOGLOBIN GLYCOSYLATED A1C: CPT | Performed by: NURSE PRACTITIONER

## 2023-10-30 PROCEDURE — 84443 ASSAY THYROID STIM HORMONE: CPT | Performed by: NURSE PRACTITIONER

## 2023-11-01 ENCOUNTER — OFFICE VISIT (OUTPATIENT)
Dept: FAMILY MEDICINE | Facility: CLINIC | Age: 58
End: 2023-11-01
Payer: MEDICARE

## 2023-11-01 VITALS
OXYGEN SATURATION: 97 % | HEART RATE: 77 BPM | DIASTOLIC BLOOD PRESSURE: 70 MMHG | RESPIRATION RATE: 20 BRPM | HEIGHT: 69 IN | TEMPERATURE: 98 F | SYSTOLIC BLOOD PRESSURE: 124 MMHG | BODY MASS INDEX: 29.77 KG/M2 | WEIGHT: 201 LBS

## 2023-11-01 DIAGNOSIS — K59.00 CONSTIPATION, UNSPECIFIED CONSTIPATION TYPE: ICD-10-CM

## 2023-11-01 DIAGNOSIS — E87.6 HYPOKALEMIA: ICD-10-CM

## 2023-11-01 DIAGNOSIS — M10.9 GOUTY ARTHRITIS OF LEFT GREAT TOE: ICD-10-CM

## 2023-11-01 DIAGNOSIS — I10 BENIGN ESSENTIAL HTN: ICD-10-CM

## 2023-11-01 DIAGNOSIS — E78.5 HYPERLIPIDEMIA, UNSPECIFIED HYPERLIPIDEMIA TYPE: ICD-10-CM

## 2023-11-01 DIAGNOSIS — E78.2 MIXED HYPERLIPIDEMIA: ICD-10-CM

## 2023-11-01 DIAGNOSIS — G40.409 OTHER GENERALIZED EPILEPSY, NOT INTRACTABLE, WITHOUT STATUS EPILEPTICUS: ICD-10-CM

## 2023-11-01 DIAGNOSIS — I25.10 CORONARY ARTERY DISEASE, UNSPECIFIED VESSEL OR LESION TYPE, UNSPECIFIED WHETHER ANGINA PRESENT, UNSPECIFIED WHETHER NATIVE OR TRANSPLANTED HEART: Primary | ICD-10-CM

## 2023-11-01 DIAGNOSIS — F10.21 ALCOHOL DEPENDENCE IN REMISSION: ICD-10-CM

## 2023-11-01 DIAGNOSIS — R52 PAIN: ICD-10-CM

## 2023-11-01 DIAGNOSIS — G40.909 SEIZURE DISORDER: ICD-10-CM

## 2023-11-01 DIAGNOSIS — F32.A DEPRESSION, UNSPECIFIED DEPRESSION TYPE: ICD-10-CM

## 2023-11-01 PROCEDURE — 99214 PR OFFICE/OUTPT VISIT, EST, LEVL IV, 30-39 MIN: ICD-10-PCS | Mod: ,,, | Performed by: NURSE PRACTITIONER

## 2023-11-01 PROCEDURE — 99214 OFFICE O/P EST MOD 30 MIN: CPT | Mod: ,,, | Performed by: NURSE PRACTITIONER

## 2023-11-01 RX ORDER — ICOSAPENT ETHYL 1000 MG/1
CAPSULE ORAL
Qty: 360 CAPSULE | Refills: 0 | Status: SHIPPED | OUTPATIENT
Start: 2023-11-01 | End: 2024-03-06 | Stop reason: SDUPTHER

## 2023-11-01 RX ORDER — POTASSIUM CHLORIDE 750 MG/1
10 TABLET, EXTENDED RELEASE ORAL 2 TIMES DAILY
Qty: 90 TABLET | Refills: 1 | Status: SHIPPED | OUTPATIENT
Start: 2023-11-01 | End: 2024-02-07 | Stop reason: SDUPTHER

## 2023-11-01 RX ORDER — CELECOXIB 200 MG/1
CAPSULE ORAL
Qty: 90 CAPSULE | Refills: 3 | Status: SHIPPED | OUTPATIENT
Start: 2023-11-01 | End: 2024-02-07 | Stop reason: SDUPTHER

## 2023-11-01 RX ORDER — LEVETIRACETAM 500 MG/1
TABLET ORAL
Qty: 270 TABLET | Refills: 0 | Status: SHIPPED | OUTPATIENT
Start: 2023-11-01 | End: 2024-01-22 | Stop reason: SDUPTHER

## 2023-11-01 RX ORDER — ROSUVASTATIN CALCIUM 10 MG/1
TABLET, COATED ORAL
Qty: 90 TABLET | Refills: 3 | Status: SHIPPED | OUTPATIENT
Start: 2023-11-01 | End: 2023-12-11 | Stop reason: SDUPTHER

## 2023-11-01 RX ORDER — CLOPIDOGREL BISULFATE 75 MG/1
TABLET ORAL
Qty: 90 TABLET | Refills: 1 | Status: SHIPPED | OUTPATIENT
Start: 2023-11-01

## 2023-11-01 RX ORDER — ESCITALOPRAM OXALATE 20 MG/1
TABLET ORAL
Qty: 90 TABLET | Refills: 1 | Status: SHIPPED | OUTPATIENT
Start: 2023-11-01

## 2023-11-01 RX ORDER — AMLODIPINE AND BENAZEPRIL HYDROCHLORIDE 10; 20 MG/1; MG/1
1 CAPSULE ORAL DAILY
Qty: 90 CAPSULE | Refills: 1 | Status: SHIPPED | OUTPATIENT
Start: 2023-11-01 | End: 2024-01-30 | Stop reason: SDUPTHER

## 2023-11-01 RX ORDER — ALLOPURINOL 300 MG/1
300 TABLET ORAL DAILY
Qty: 90 TABLET | Refills: 1 | Status: SHIPPED | OUTPATIENT
Start: 2023-11-01 | End: 2024-02-07 | Stop reason: SDUPTHER

## 2023-11-01 NOTE — ASSESSMENT & PLAN NOTE
No alcohol intake in at least 2 years and doing well continue current medications activities and notify any problems or changes

## 2023-11-01 NOTE — PROGRESS NOTES
"Subjective:       Patient ID: Phan Saenz is a 58 y.o. male.    Chief Complaint: Follow-up (F/u on lab results)    The patient returns for follow-up with his chronic conditions and just had lab.  He does report that he is drinking sometimes up to a 12 pack of soda a day because he does not like the taste of water but he is feeling better and continues to lose weight.  Has been smoking 1/2 to a pack a day not ready to quit yet but not experiencing any chest congestion at this time.  He is tolerating his medications without trouble and has not had any type of alcohol in past several years.  Feeling overall well but still does have the pain to the right side but it is not severe in the abdomen pain has improved greatly from where it was before.    Follow-up  Associated symptoms include arthralgias, fatigue, myalgias and weakness. Pertinent negatives include no abdominal pain or chills.     Review of Systems   Constitutional:  Positive for fatigue. Negative for chills.   Gastrointestinal:  Negative for abdominal pain.   Musculoskeletal:  Positive for arthralgias and myalgias.   Neurological:  Positive for weakness.         Objective:      Vitals:    11/01/23 1305   BP: 124/70   Pulse: 77   Resp: 20   Temp: 97.9 °F (36.6 °C)   SpO2: 97%   Weight: 91.2 kg (201 lb)   Height: 5' 9" (1.753 m)       Physical Exam  Vitals and nursing note reviewed.   Constitutional:       General: He is not in acute distress.     Appearance: Normal appearance. He is not ill-appearing.   Cardiovascular:      Rate and Rhythm: Normal rate and regular rhythm.      Pulses: Normal pulses.      Heart sounds: Normal heart sounds. No murmur heard.     No friction rub. No gallop.   Pulmonary:      Effort: No respiratory distress.      Breath sounds: No wheezing, rhonchi or rales.   Abdominal:      General: Bowel sounds are normal.      Palpations: Abdomen is soft.   Musculoskeletal:         General: No swelling (left first metatarsal joint " erythema and tenderness with edema. limited flexion of left great toe.) or tenderness.      Right lower leg: No edema.      Left lower leg: No edema.   Skin:     General: Skin is warm and dry.      Findings: Abrasion present.             Comments: Shallow abrasion   Neurological:      General: No focal deficit present.      Mental Status: He is alert.      Motor: Weakness present.      Comments: Right sided weakness to upper and lower extremity, walking with limp   Psychiatric:         Mood and Affect: Mood normal.         Behavior: Behavior normal.         Assessment/Plan:     1. Coronary artery disease, unspecified vessel or lesion type, unspecified whether angina present, unspecified whether native or transplanted heart  -     clopidogreL (PLAVIX) 75 mg tablet; TAKE ONE(1) TAB BY MOUTH ONCE A DAY FOR BLOOD THINNER TO HELP PREVENT HEART ATTACK &/OR STROKE  Dispense: 90 tablet; Refill: 1    2. Other generalized epilepsy, not intractable, without status epilepticus  -     levETIRAcetam (KEPPRA) 500 MG Tab; TAKE ONE&ONE HALF (1&1/2) TABS BY MOUTH TWICE DAILY WITH FOOD FOR SEIZURES  MULTIPLE BOTTLES  Dispense: 270 tablet; Refill: 0    3. Mixed hyperlipidemia  -     rosuvastatin (CRESTOR) 10 MG tablet; See Instructions, TAKE ONE(1) TAB BY MOUTH EVERY NIGHT AT BEDTIME FOR CHOLESTEROL, # 90 tab(s), 1 Refill(s), Pharmacy: [facility], 177.5, cm, Height/Length Dosing, 01/18/23 9:50:00 CST, 92.98, kg, Weight Dosing, 01/18/23 9:50:00 CST  Dispense: 90 tablet; Refill: 3    4. Hyperlipidemia, unspecified hyperlipidemia type  -     icosapent ethyL (VASCEPA) 1 gram Cap; See Instructions, TAKE TWO(2) CAPS BY MOUTH TWICE DAILY FOR CHOLESTEROL ** MUTLIPLE BOTTLES **, # 360 cap(s), 1 Refill(s), Pharmacy: [facility], 177.5, cm, Height/Length Dosing, 06/29/22 9:46:00 CDT, 102.05, kg, Weight Dosing, 06/29/22 9:4...  Dispense: 360 capsule; Refill: 0    5. Constipation, unspecified constipation type  -     linaCLOtide  (LINZESS) 72 mcg Cap capsule; TAKE ONE(1) CAP EVERY DAY 30 MINUTES BEFORE THE 1ST FIRST MEAL OF THE DAY WITH A FULL GLASS OF WATER FOR CONSTIPATION  Dispense: 90 capsule; Refill: 1    6. Pain  -     celecoxib (CELEBREX) 200 MG capsule; Take 1 tablet twice daily if needed for pain and arthritis but take it with food and avoid any additional pain medicines such as ibuprofen.  Dispense: 90 capsule; Refill: 3    7. Depression, unspecified depression type  -     EScitalopram oxalate (LEXAPRO) 20 MG tablet; TAKE ONE(1) TAB BY MOUTH EVERY NIGHT AT BEDTIME FOR DEPRESSION, ANXIETY, &/OR IRRITABILITY  Dispense: 90 tablet; Refill: 1    8. Benign essential HTN  -     amlodipine-benazepril 10-20mg (LOTREL) 10-20 mg per capsule; Take 1 capsule by mouth Daily.  Dispense: 90 capsule; Refill: 1    9. Alcohol dependence in remission  Assessment & Plan:  No alcohol intake in at least 2 years and doing well continue current medications activities and notify any problems or changes      10. Hypokalemia  -     potassium chloride (KLOR-CON) 10 MEQ TbSR; Take 1 tablet (10 mEq total) by mouth 2 (two) times daily.  Dispense: 90 tablet; Refill: 1    11. Seizure disorder  Assessment & Plan:  Keppra currently at 39 point therapeutic no seizures in some time continue current medicines with 1-1/2 bid         No follow-ups on file.          Discussed the diagnosis, prognosis, plan of care, chronic nature of and indications for, risks and benefits of treatment for conditions.  Continue all medications as prescribed unless otherwise noted.   Call if patient develops other symptoms or if not better in 2-3 days and sooner if worse. Emphasized the importance of compliance with all recommendations, including medication use and timely follow up. Instructed to return as noted be or sooner if patient develops any other problems or if there are any other additional questions or concerns.

## 2023-11-01 NOTE — ASSESSMENT & PLAN NOTE
Keppra currently at 39 point therapeutic no seizures in some time continue current medicines with 1-1/2 bid

## 2023-11-07 DIAGNOSIS — J44.9 MILD CHRONIC OBSTRUCTIVE PULMONARY DISEASE: Primary | ICD-10-CM

## 2023-11-07 RX ORDER — UMECLIDINIUM BROMIDE AND VILANTEROL TRIFENATATE 62.5; 25 UG/1; UG/1
POWDER RESPIRATORY (INHALATION)
Qty: 180 EACH | Refills: 2 | Status: SHIPPED | OUTPATIENT
Start: 2023-11-07

## 2023-11-30 ENCOUNTER — TELEPHONE (OUTPATIENT)
Dept: FAMILY MEDICINE | Facility: CLINIC | Age: 58
End: 2023-11-30
Payer: MEDICARE

## 2023-11-30 DIAGNOSIS — M79.2 NERVE PAIN: ICD-10-CM

## 2023-11-30 RX ORDER — GABAPENTIN 400 MG/1
CAPSULE ORAL
Qty: 90 CAPSULE | Refills: 1 | Status: SHIPPED | OUTPATIENT
Start: 2023-11-30 | End: 2024-01-30 | Stop reason: SDUPTHER

## 2023-11-30 NOTE — TELEPHONE ENCOUNTER
----- Message from Lucas Feliciano MA sent at 11/30/2023  9:39 AM CST -----  Vanessa       Gabapentin       [address]

## 2023-12-04 ENCOUNTER — TELEPHONE (OUTPATIENT)
Dept: FAMILY MEDICINE | Facility: CLINIC | Age: 58
End: 2023-12-04
Payer: MEDICARE

## 2023-12-04 DIAGNOSIS — J30.2 SEASONAL ALLERGIES: ICD-10-CM

## 2023-12-04 RX ORDER — CETIRIZINE HYDROCHLORIDE 10 MG/1
TABLET ORAL
Qty: 90 TABLET | Refills: 0 | Status: SHIPPED | OUTPATIENT
Start: 2023-12-04 | End: 2024-03-06 | Stop reason: SDUPTHER

## 2023-12-04 NOTE — TELEPHONE ENCOUNTER
----- Message from Pebbles Garcia sent at 12/4/2023  9:30 AM CST -----  Regarding: Med refill  He needs a refill on his Cetirizine  10mg    [address]

## 2023-12-11 ENCOUNTER — TELEPHONE (OUTPATIENT)
Dept: FAMILY MEDICINE | Facility: CLINIC | Age: 58
End: 2023-12-11
Payer: MEDICARE

## 2023-12-11 DIAGNOSIS — Z86.39 HISTORY OF NON ANEMIC VITAMIN B12 DEFICIENCY: ICD-10-CM

## 2023-12-11 DIAGNOSIS — E78.2 MIXED HYPERLIPIDEMIA: ICD-10-CM

## 2023-12-11 RX ORDER — ROSUVASTATIN CALCIUM 10 MG/1
TABLET, COATED ORAL
Qty: 90 TABLET | Refills: 3 | Status: SHIPPED | OUTPATIENT
Start: 2023-12-11

## 2023-12-11 RX ORDER — LANOLIN ALCOHOL/MO/W.PET/CERES
1000 CREAM (GRAM) TOPICAL DAILY
Qty: 90 TABLET | Refills: 3 | Status: SHIPPED | OUTPATIENT
Start: 2023-12-11

## 2023-12-11 NOTE — TELEPHONE ENCOUNTER
----- Message from Lesvia Degroot sent at 12/11/2023  3:16 PM CST -----  Patient needs refill on B-12 and Rouvastatin called in to [facility] [address]

## 2024-01-22 ENCOUNTER — TELEPHONE (OUTPATIENT)
Dept: FAMILY MEDICINE | Facility: CLINIC | Age: 59
End: 2024-01-22
Payer: MEDICARE

## 2024-01-22 DIAGNOSIS — G40.409 OTHER GENERALIZED EPILEPSY, NOT INTRACTABLE, WITHOUT STATUS EPILEPTICUS: ICD-10-CM

## 2024-01-22 RX ORDER — LEVETIRACETAM 500 MG/1
TABLET ORAL
Qty: 270 TABLET | Refills: 0 | Status: SHIPPED | OUTPATIENT
Start: 2024-01-22 | End: 2024-04-15 | Stop reason: SDUPTHER

## 2024-01-22 NOTE — TELEPHONE ENCOUNTER
----- Message from Lesvia Degroot sent at 1/22/2024  2:42 PM CST -----  Patient needs refill on Keppra 500 mg called in to [facility] [address]

## 2024-01-30 ENCOUNTER — TELEPHONE (OUTPATIENT)
Dept: FAMILY MEDICINE | Facility: CLINIC | Age: 59
End: 2024-01-30
Payer: MEDICARE

## 2024-01-30 DIAGNOSIS — I10 BENIGN ESSENTIAL HTN: ICD-10-CM

## 2024-01-30 DIAGNOSIS — K21.9 GASTROESOPHAGEAL REFLUX DISEASE WITHOUT ESOPHAGITIS: Primary | ICD-10-CM

## 2024-01-30 DIAGNOSIS — M79.2 NERVE PAIN: ICD-10-CM

## 2024-01-30 RX ORDER — GABAPENTIN 400 MG/1
CAPSULE ORAL
Qty: 90 CAPSULE | Refills: 1 | Status: SHIPPED | OUTPATIENT
Start: 2024-01-30 | End: 2024-04-08 | Stop reason: SDUPTHER

## 2024-01-30 RX ORDER — DEXLANSOPRAZOLE 60 MG/1
CAPSULE, DELAYED RELEASE ORAL
Qty: 90 CAPSULE | Refills: 1 | Status: SHIPPED | OUTPATIENT
Start: 2024-01-30 | End: 2024-02-07 | Stop reason: CLARIF

## 2024-01-30 RX ORDER — AMLODIPINE AND BENAZEPRIL HYDROCHLORIDE 10; 20 MG/1; MG/1
1 CAPSULE ORAL DAILY
Qty: 90 CAPSULE | Refills: 1 | Status: SHIPPED | OUTPATIENT
Start: 2024-01-30 | End: 2024-02-07 | Stop reason: SDUPTHER

## 2024-01-30 NOTE — TELEPHONE ENCOUNTER
----- Message from Lucas Feliciano MA sent at 1/30/2024  9:14 AM CST -----  Vanessa      Amlodipine- benzapril 10-20 mg      Gabapentin 400mg    [address]

## 2024-02-05 PROCEDURE — 80069 RENAL FUNCTION PANEL: CPT | Performed by: NURSE PRACTITIONER

## 2024-02-05 PROCEDURE — 80177 DRUG SCRN QUAN LEVETIRACETAM: CPT | Performed by: NURSE PRACTITIONER

## 2024-02-05 PROCEDURE — 84550 ASSAY OF BLOOD/URIC ACID: CPT | Performed by: NURSE PRACTITIONER

## 2024-02-05 PROCEDURE — 85025 COMPLETE CBC W/AUTO DIFF WBC: CPT | Performed by: NURSE PRACTITIONER

## 2024-02-07 ENCOUNTER — OFFICE VISIT (OUTPATIENT)
Dept: FAMILY MEDICINE | Facility: CLINIC | Age: 59
End: 2024-02-07
Payer: MEDICARE

## 2024-02-07 ENCOUNTER — TELEPHONE (OUTPATIENT)
Dept: FAMILY MEDICINE | Facility: CLINIC | Age: 59
End: 2024-02-07

## 2024-02-07 VITALS
OXYGEN SATURATION: 96 % | RESPIRATION RATE: 20 BRPM | TEMPERATURE: 98 F | HEART RATE: 89 BPM | SYSTOLIC BLOOD PRESSURE: 119 MMHG | DIASTOLIC BLOOD PRESSURE: 61 MMHG | BODY MASS INDEX: 31.1 KG/M2 | HEIGHT: 69 IN | WEIGHT: 210 LBS

## 2024-02-07 DIAGNOSIS — R52 PAIN: ICD-10-CM

## 2024-02-07 DIAGNOSIS — M10.9 GOUTY ARTHRITIS OF LEFT GREAT TOE: ICD-10-CM

## 2024-02-07 DIAGNOSIS — K59.00 CONSTIPATION, UNSPECIFIED CONSTIPATION TYPE: ICD-10-CM

## 2024-02-07 DIAGNOSIS — I10 BENIGN ESSENTIAL HTN: ICD-10-CM

## 2024-02-07 DIAGNOSIS — Z86.39 HISTORY OF HYPOKALEMIA: ICD-10-CM

## 2024-02-07 DIAGNOSIS — K59.09 CHRONIC CONSTIPATION: ICD-10-CM

## 2024-02-07 DIAGNOSIS — E87.6 HYPOKALEMIA: ICD-10-CM

## 2024-02-07 DIAGNOSIS — E79.0 HYPERURICEMIA W/O SIGNS OF INFLAM ARTHRIT AND TOPHACEOUS DIS: Primary | ICD-10-CM

## 2024-02-07 PROCEDURE — 99214 OFFICE O/P EST MOD 30 MIN: CPT | Mod: ,,, | Performed by: NURSE PRACTITIONER

## 2024-02-07 RX ORDER — CELECOXIB 200 MG/1
CAPSULE ORAL
Qty: 90 CAPSULE | Refills: 3 | Status: SHIPPED | OUTPATIENT
Start: 2024-02-07

## 2024-02-07 RX ORDER — AMLODIPINE AND BENAZEPRIL HYDROCHLORIDE 10; 20 MG/1; MG/1
1 CAPSULE ORAL DAILY
Qty: 90 CAPSULE | Refills: 1 | Status: SHIPPED | OUTPATIENT
Start: 2024-02-07 | End: 2024-08-05

## 2024-02-07 RX ORDER — POTASSIUM CHLORIDE 750 MG/1
10 TABLET, EXTENDED RELEASE ORAL 2 TIMES DAILY
Qty: 90 TABLET | Refills: 1 | Status: SHIPPED | OUTPATIENT
Start: 2024-02-07 | End: 2024-05-07 | Stop reason: SDUPTHER

## 2024-02-07 RX ORDER — ALLOPURINOL 300 MG/1
300 TABLET ORAL DAILY
Qty: 90 TABLET | Refills: 1 | Status: SHIPPED | OUTPATIENT
Start: 2024-02-07 | End: 2024-05-07 | Stop reason: SDUPTHER

## 2024-02-07 RX ORDER — PANTOPRAZOLE SODIUM 40 MG/1
40 TABLET, DELAYED RELEASE ORAL DAILY
Qty: 30 TABLET | Refills: 2 | Status: SHIPPED | OUTPATIENT
Start: 2024-02-07 | End: 2024-04-15 | Stop reason: SDUPTHER

## 2024-02-07 NOTE — ASSESSMENT & PLAN NOTE
Lotrel 10/20 one daily, The current medical regimen is effective;  continue present plan and medications.

## 2024-02-07 NOTE — TELEPHONE ENCOUNTER
----- Message from Pebbles Garcia sent at 2/7/2024 10:35 AM CST -----  Regarding: Med refill  Needs a refill on his Linzess 72mcg      [address]

## 2024-02-07 NOTE — ASSESSMENT & PLAN NOTE
Taking the medicine daily for the constipation and feels that the bowels are emptying well but still has intermittent right lower quadrant discomfort despite this not causing any real problems just a chronic pain.

## 2024-02-07 NOTE — ASSESSMENT & PLAN NOTE
Uric down from 10.1 to 5.7, continue allopurinol 300 mg daily. Recheck with other lab in 6 months unless problems.

## 2024-02-07 NOTE — ASSESSMENT & PLAN NOTE
Potassium with last check was 3.3, now has returned to 4.8 with normal electrolyte and eGFR currently >90. Continue klor-con 10 mEq bid. Recheck cmp in 3 months with other lab.

## 2024-02-07 NOTE — ASSESSMENT & PLAN NOTE
Drinking 12 sodas daily but staying active no change in appetite or diet beyond this and not ready to quit at this time but might consider it in the future.

## 2024-02-07 NOTE — PROGRESS NOTES
"Subjective:       Patient ID: Phan Saenz is a 58 y.o. male.    Chief Complaint: No chief complaint on file.    Patient presents for follow-up with hyperuricemia and low potassium.  He has recently had labs drawn.  Since his last visit he has been taking the allopurinol with the potassium without any problems not having any swallowing problems and does feel that it is working well.  He is overall feeling well but no signs of gout or kidney stones lately does have reflux in his insurance company will no longer be pain for the Dexilant we will need to try something else.  He is having good bowel movements but does complain of right lower quadrant abdominal discomfort but it has not been acute or changes from what he normally has.  He is taking the Celebrex twice a day and does feel that it is helpful though it has not effective and taking all of the discomfort of the arthritis away.  He is living at home not smoking not drinking any alcohol does drink 12 carbonated beverages daily.      Review of Systems   Constitutional:  Positive for fatigue. Negative for chills.   Gastrointestinal:  Positive for abdominal pain. Negative for change in bowel habit and constipation.   Musculoskeletal:  Positive for arthralgias and myalgias.   Neurological:  Positive for weakness.   All other systems reviewed and are negative.        Objective:      Vitals:    02/07/24 1305   BP: 119/61   Pulse: 89   Resp: 20   Temp: 98 °F (36.7 °C)   SpO2: 96%   Weight: 95.3 kg (210 lb)   Height: 5' 9" (1.753 m)       Physical Exam  Vitals and nursing note reviewed.   Constitutional:       General: He is not in acute distress.     Appearance: Normal appearance. He is not ill-appearing.   Cardiovascular:      Rate and Rhythm: Normal rate and regular rhythm.      Pulses: Normal pulses.      Heart sounds: Normal heart sounds. No murmur heard.     No friction rub. No gallop.   Pulmonary:      Effort: No respiratory distress.      Breath sounds: " No wheezing, rhonchi or rales.   Abdominal:      General: Bowel sounds are normal.      Palpations: Abdomen is soft.   Musculoskeletal:         General: No swelling (left first metatarsal joint erythema and tenderness with edema. limited flexion of left great toe.) or tenderness.      Right lower leg: No edema.      Left lower leg: No edema.   Skin:     General: Skin is warm and dry.      Capillary Refill: Capillary refill takes 2 to 3 seconds.      Findings: No abrasion.          Neurological:      General: No focal deficit present.      Mental Status: He is alert.      Motor: Weakness present.      Comments: Right sided weakness to upper and lower extremity, walking with limp   Psychiatric:         Mood and Affect: Mood normal.         Behavior: Behavior normal.         Assessment/Plan:     1. Hyperuricemia w/o signs of inflam arthrit and tophaceous dis  Assessment & Plan:  Uric down from 10.1 to 5.7, continue allopurinol 300 mg daily. Recheck with other lab in 6 months unless problems.       2. History of hypokalemia  Assessment & Plan:  Potassium with last check was 3.3, now has returned to 4.8 with normal electrolyte and eGFR currently >90. Continue klor-con 10 mEq bid. Recheck cmp in 3 months with other lab.       3. Benign essential HTN  Assessment & Plan:  Lotrel 10/20 one daily, The current medical regimen is effective;  continue present plan and medications.      Orders:  -     amlodipine-benazepril 10-20mg (LOTREL) 10-20 mg per capsule; Take 1 capsule by mouth Daily.  Dispense: 90 capsule; Refill: 1    4. BMI 31.0-31.9,adult  Assessment & Plan:  Drinking 12 sodas daily but staying active no change in appetite or diet beyond this and not ready to quit at this time but might consider it in the future.      5. Chronic constipation  Assessment & Plan:  Taking the medicine daily for the constipation and feels that the bowels are emptying well but still has intermittent right lower quadrant discomfort despite  this not causing any real problems just a chronic pain.      6. Gouty arthritis of left great toe  -     allopurinoL (ZYLOPRIM) 300 MG tablet; Take 1 tablet (300 mg total) by mouth once daily.  Dispense: 90 tablet; Refill: 1    7. Pain  -     celecoxib (CELEBREX) 200 MG capsule; Take 1 tablet twice daily if needed for pain and arthritis but take it with food and avoid any additional pain medicines such as ibuprofen.  Dispense: 90 capsule; Refill: 3    8. Constipation, unspecified constipation type  -     linaCLOtide (LINZESS) 72 mcg Cap capsule; TAKE ONE(1) CAP EVERY DAY 30 MINUTES BEFORE THE 1ST FIRST MEAL OF THE DAY WITH A FULL GLASS OF WATER FOR CONSTIPATION  Dispense: 90 capsule; Refill: 1    9. Hypokalemia  -     potassium chloride (KLOR-CON) 10 MEQ TbSR; Take 1 tablet (10 mEq total) by mouth 2 (two) times daily.  Dispense: 90 tablet; Refill: 1    Other orders  -     pantoprazole (PROTONIX) 40 MG tablet; Take 1 tablet (40 mg total) by mouth once daily.  Dispense: 30 tablet; Refill: 2       Follow up in about 3 months (around 5/7/2024).          Discussed the diagnosis, prognosis, plan of care, chronic nature of and indications for, risks and benefits of treatment for conditions.  Continue all medications as prescribed unless otherwise noted.   Call if patient develops other symptoms or if not better in 2-3 days and sooner if worse. Emphasized the importance of compliance with all recommendations, including medication use and timely follow up. Instructed to return as noted be or sooner if patient develops any other problems or if there are any other additional questions or concerns.

## 2024-03-06 ENCOUNTER — TELEPHONE (OUTPATIENT)
Dept: FAMILY MEDICINE | Facility: CLINIC | Age: 59
End: 2024-03-06
Payer: MEDICARE

## 2024-03-06 DIAGNOSIS — J30.2 SEASONAL ALLERGIES: ICD-10-CM

## 2024-03-06 DIAGNOSIS — E78.5 HYPERLIPIDEMIA, UNSPECIFIED HYPERLIPIDEMIA TYPE: ICD-10-CM

## 2024-03-06 DIAGNOSIS — K59.00 CONSTIPATION, UNSPECIFIED CONSTIPATION TYPE: ICD-10-CM

## 2024-03-06 RX ORDER — CETIRIZINE HYDROCHLORIDE 10 MG/1
TABLET ORAL
Qty: 90 TABLET | Refills: 1 | Status: SHIPPED | OUTPATIENT
Start: 2024-03-06

## 2024-03-06 RX ORDER — ICOSAPENT ETHYL 1 G/1
CAPSULE ORAL
Qty: 360 CAPSULE | Refills: 1 | Status: SHIPPED | OUTPATIENT
Start: 2024-03-06 | End: 2024-05-07 | Stop reason: SDUPTHER

## 2024-03-06 NOTE — TELEPHONE ENCOUNTER
----- Message from Lesvia Degroot sent at 3/6/2024  1:27 PM CST -----  Patient needs refill on Vacepa 1 gr, Linzess 72 mcg and Ceterizine 10 mg called in to [facility] [address]

## 2024-03-18 DIAGNOSIS — R10.9 STOMACH CRAMPS: ICD-10-CM

## 2024-03-18 RX ORDER — DICYCLOMINE HYDROCHLORIDE 20 MG/1
TABLET ORAL
Qty: 90 TABLET | Refills: 4 | Status: SHIPPED | OUTPATIENT
Start: 2024-03-18

## 2024-04-08 ENCOUNTER — TELEPHONE (OUTPATIENT)
Dept: FAMILY MEDICINE | Facility: CLINIC | Age: 59
End: 2024-04-08
Payer: MEDICARE

## 2024-04-08 DIAGNOSIS — M79.2 NERVE PAIN: ICD-10-CM

## 2024-04-08 RX ORDER — GABAPENTIN 400 MG/1
CAPSULE ORAL
Qty: 90 CAPSULE | Refills: 1 | Status: SHIPPED | OUTPATIENT
Start: 2024-04-08 | End: 2024-05-13 | Stop reason: SDUPTHER

## 2024-04-08 NOTE — TELEPHONE ENCOUNTER
----- Message from Lucas Feliciano MA sent at 4/8/2024 10:29 AM CDT -----  Vanessa     Gabapentin 400mg      [address]

## 2024-04-10 ENCOUNTER — TELEPHONE (OUTPATIENT)
Dept: FAMILY MEDICINE | Facility: CLINIC | Age: 59
End: 2024-04-10
Payer: MEDICARE

## 2024-04-10 NOTE — TELEPHONE ENCOUNTER
----- Message from Vanessa Kincaid DNP sent at 4/10/2024  5:36 AM CDT -----  Regarding: FW: CALLUM GROVER  Notify patient vascepa no longer on his formulary and there is no substitution for him to work. Notify him and recheck lab earlier, late June to check response. Warn him he'll need to decrease sweet drinks and triglycerides to make up for living without medication  ----- Message -----  From: Shannon Byrne LPN  Sent: 4/9/2024   1:32 PM CDT  To: Vanessa Kincaid DNP  Subject: FW: CALLUM GROVER                                      ----- Message -----  From: Janis Haywood  Sent: 4/9/2024   1:29 PM CDT  To: [facility] Staff  Subject: CALLUM GROVER                                        VASCEPA PA DENIED , NOT ON FORMULARY. IM TRYING TO FIND A [employer] FORMULARY . 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

## 2024-04-10 NOTE — TELEPHONE ENCOUNTER
Spoke with patient's father. He stated that he picked up Vascepa yesterday at the pharmacy and it was covered fully.

## 2024-04-15 ENCOUNTER — OFFICE VISIT (OUTPATIENT)
Dept: FAMILY MEDICINE | Facility: CLINIC | Age: 59
End: 2024-04-15
Payer: MEDICARE

## 2024-04-15 VITALS
SYSTOLIC BLOOD PRESSURE: 126 MMHG | BODY MASS INDEX: 29.03 KG/M2 | HEIGHT: 69 IN | TEMPERATURE: 97 F | HEART RATE: 73 BPM | DIASTOLIC BLOOD PRESSURE: 71 MMHG | OXYGEN SATURATION: 97 % | RESPIRATION RATE: 20 BRPM | WEIGHT: 196 LBS

## 2024-04-15 DIAGNOSIS — K22.70 BARRETT'S ESOPHAGUS WITHOUT DYSPLASIA: ICD-10-CM

## 2024-04-15 DIAGNOSIS — E55.9 VITAMIN D DEFICIENCY: Primary | ICD-10-CM

## 2024-04-15 DIAGNOSIS — K86.81 EXOCRINE PANCREATIC INSUFFICIENCY: ICD-10-CM

## 2024-04-15 DIAGNOSIS — G40.409 OTHER GENERALIZED EPILEPSY, NOT INTRACTABLE, WITHOUT STATUS EPILEPTICUS: ICD-10-CM

## 2024-04-15 PROCEDURE — 99214 OFFICE O/P EST MOD 30 MIN: CPT | Mod: ,,, | Performed by: NURSE PRACTITIONER

## 2024-04-15 RX ORDER — LEVETIRACETAM 500 MG/1
TABLET ORAL
Qty: 270 TABLET | Refills: 0 | Status: SHIPPED | OUTPATIENT
Start: 2024-04-15 | End: 2024-05-07 | Stop reason: SDUPTHER

## 2024-04-15 RX ORDER — PANCRELIPASE 36000; 180000; 114000 [USP'U]/1; [USP'U]/1; [USP'U]/1
2 CAPSULE, DELAYED RELEASE PELLETS ORAL DAILY
Qty: 180 CAPSULE | Refills: 1 | Status: SHIPPED | OUTPATIENT
Start: 2024-04-15

## 2024-04-15 RX ORDER — PANTOPRAZOLE SODIUM 40 MG/1
40 TABLET, DELAYED RELEASE ORAL DAILY
Qty: 30 TABLET | Refills: 2 | Status: SHIPPED | OUTPATIENT
Start: 2024-04-15

## 2024-04-15 NOTE — ASSESSMENT & PLAN NOTE
Patient recently has had an appointment with Dr. 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 Protonix 40 mg daily with bland diet and wait 2-3 hours after last meal before lying down.  Dexilant 60 worked much better but can not afford pay for it.

## 2024-04-15 NOTE — ASSESSMENT & PLAN NOTE
Last check was 38 but currently vitamin-D is only 19.  Goal between.  45-65 vitamin D 50,000 weekly and recheck bmp, vitamin D in 4 months.

## 2024-04-15 NOTE — PROGRESS NOTES
"SUBJECTIVE:  Phan Saenz is a 59 y.o. male here alone for Seizures and Heartburn      HPI  Pt in with father with complaints of ins no longer cover dexilant. Wanting to discuss if there is something else he can take. Seizures have been controlled while on keppra. Pt states that it has been so long since last one that he doesn't remember. Father states that he has had 2 seizures a few hours apart. Has been with father for the past 10-15 years.     Sahils allergies, medications, history, and problem list were updated as appropriate.    Review of Systems   A comprehensive review of symptoms was completed and negative except as noted above.    OBJECTIVE:  Vital signs  Vitals:    04/15/24 1410   BP: 126/71   BP Location: Left arm   Patient Position: Sitting   Pulse: 73   Resp: 20   Temp: 97.2 °F (36.2 °C)   SpO2: 97%   Weight: 88.9 kg (196 lb)   Height: 5' 9" (1.753 m)        Physical Exam  Vitals and nursing note reviewed.   Constitutional:       General: He is not in acute distress.     Appearance: Normal appearance. He is not ill-appearing.   Cardiovascular:      Rate and Rhythm: Normal rate and regular rhythm.      Pulses: Normal pulses.      Heart sounds: Normal heart sounds. No murmur heard.     No friction rub. No gallop.   Pulmonary:      Effort: No respiratory distress.      Breath sounds: No wheezing, rhonchi or rales.   Abdominal:      General: Bowel sounds are increased.      Palpations: Abdomen is soft.      Tenderness: There is generalized abdominal tenderness.   Musculoskeletal:         General: No swelling (left first metatarsal joint erythema and tenderness with edema. limited flexion of left great toe.) or tenderness.      Right lower leg: No edema.      Left lower leg: No edema.   Skin:     General: Skin is warm and dry.      Capillary Refill: Capillary refill takes 2 to 3 seconds.      Findings: No abrasion.          Neurological:      General: No focal deficit present.      Mental Status: " He is alert.      Motor: Weakness present.      Comments: Right sided weakness to upper and lower extremity, walking with limp   Psychiatric:         Mood and Affect: Mood normal.         Behavior: Behavior normal.          No visits with results within 1 Day(s) from this visit.   Latest known visit with results is:   Lab Visit on 02/05/2024   Component Date Value Ref Range Status    Sodium Level 02/05/2024 143  135 - 145 mmol/L Final    Potassium Level 02/05/2024 3.8  3.5 - 5.1 mmol/L Final    Chloride 02/05/2024 113 (H)  98 - 110 mmol/L Final    Carbon Dioxide 02/05/2024 22  21 - 32 mmol/L Final    Glucose Level 02/05/2024 99  70 - 115 mg/dL Final    Blood Urea Nitrogen 02/05/2024 7.0  7.0 - 20.0 mg/dL Final    Creatinine 02/05/2024 0.59 (L)  0.66 - 1.25 mg/dL Final    Calcium Level Total 02/05/2024 9.4  8.4 - 10.2 mg/dL Final    Albumin Level 02/05/2024 4.1  3.4 - 5.0 g/dL Final    Phosphorus Level 02/05/2024 3.8  2.5 - 4.9 mg/dL Final    eGFR 02/05/2024 >90  mls/min/1.73/m2 Final                         EGFR INTERPRETATION    Beginning 8/15/22 we are reporting the eGFRcr calculation as recommended by the National Kidney Foundation. The eGFRcr equation has similar overall performance characteristics to the older equation, but the values may differ by more than 10% particularly at higher values of eGFRcr and younger adult ages.    NKF stages of chronic kidney disease (CKD)  Stage 1: Kidney damage with normal or increased eGFR (>90 mL/min/1.73 m^2)  Stage 2: Mild reduction in GFR (60-89 mL/min/1.73 m^2)  Stage 3a: Moderate reduction in GFR (45-59 mL/min/1.73 m^2)  Stage 3b: Moderate reduction in GFR (30-44 mL/min/1.73 m^2)  Stage 4: Severe reduction in GFR (15-29 mL/min/1.73 m^2)  Stage 5: Kidney failure (GFR <15 mL/min/1.73 m^2)        BUN/Creatinine Ratio 02/05/2024 12  12 - 20 Final    Uric Acid 02/05/2024 5.7  2.6 - 7.2 mg/dL Final    Levetiracetam, S 02/05/2024 13.7  10.0 - 40.0 mcg/mL Final        -------------------ADDITIONAL INFORMATION-------------------  This test was developed and its performance characteristics   determined by [facility] in a manner consistent with CLIA   requirements. This test has not been cleared or approved by   the U.S. Food and Drug Administration.     Test Performed by:  [facility]  [address] [place] [address]  : Jean-Paul Wen M.D. Ph.D.; CLIA# [id number]    WBC 02/05/2024 5.07  4.00 - 11.50 x10(3)/mcL Final    RBC 02/05/2024 3.76 (L)  4.00 - 6.00 x10(6)/mcL Final    Hgb 02/05/2024 11.7 (L)  13.0 - 18.0 g/dL Final    Hct 02/05/2024 33.9 (L)  36.0 - 52.0 % Final    MCV 02/05/2024 90.2  79.0 - 99.0 fL Final    MCH 02/05/2024 31.1  27.0 - 34.0 pg Final    MCHC 02/05/2024 34.5  31.0 - 37.0 g/dL Final    RDW 02/05/2024 13.6  % Final    Platelet 02/05/2024 289  140 - 371 x10(3)/mcL Final    MPV 02/05/2024 9.6  9.4 - 12.4 fL Final    Neut % 02/05/2024 44.1  37 - 73 % Final    Lymph % 02/05/2024 37.5  20 - 55 % Final    Mono % 02/05/2024 6.9  4.7 - 12.5 % Final    Eos % 02/05/2024 10.5 (H)  0.7 - 7 % Final    Basophil % 02/05/2024 0.6  0.1 - 1.2 % Final    Lymph # 02/05/2024 1.90  1.32 - 3.57 x10(3)/mcL Final    Neut # 02/05/2024 2.24  1.78 - 5.38 x10(3)/mcL Final    Mono # 02/05/2024 0.35  0.3 - 0.82 x10(3)/mcL Final    Eos # 02/05/2024 0.53  0.04 - 0.54 x10(3)/mcL Final    Baso # 02/05/2024 0.03  0.01 - 0.08 x10(3)/mcL Final    IG# 02/05/2024 0.02  0.0001 - 0.031 x10(3)/mcL Final    IG% 02/05/2024 0.4  0 - 0.5 % Final    NRBC% 02/05/2024 0.0  <=1 % Final          ASSESSMENT/PLAN:    1. Vitamin D deficiency  Assessment & Plan:  Last check was 38 but currently vitamin-D is only 19.  Goal between.  45-65 vitamin D 50,000 weekly and recheck bmp, vitamin D in 4 months.       2. Bowers's esophagus without dysplasia  Overview:  Dr Bryant visit continue pantoprazole 40 mg daily. F/u 1 year if needed  sooner    Assessment & Plan:  Patient recently has had an appointment with Dr. 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 Protonix 40 mg daily with bland diet and wait 2-3 hours after last meal before lying down.  Dexilant 60 worked much better but can not afford pay for it.    Orders:  -     pantoprazole (PROTONIX) 40 MG tablet; Take 1 tablet (40 mg total) by mouth once daily.  Dispense: 30 tablet; Refill: 2    3. Exocrine pancreatic insufficiency  Overview:  Dr Bryant, exocrine pancreatic insufficiency; likely 2/2 chronic alcohol abuse, ?, gastroparesis started creon 36K3 cap with food intake    Assessment & Plan:  Creon 36K 2 cap at noon with food and tolerating this without GI upset.  Had seen GI NP within the last year and she change the medication to Zenpep but it caused increased abdominal discomfort and tried for 1-1/2 prescriptions before he stopped and resume the Creon.  With the Creon just 2 tablets daily he is tolerating his food and feeling much better please send copy to Dr. Bryant    Orders:  -     CREON 36,000-114,000- 180,000 unit CpDR; Take 2 capsules by mouth Daily.  Dispense: 180 capsule; Refill: 1    4. Other generalized epilepsy, not intractable, without status epilepticus  -     levETIRAcetam (KEPPRA) 500 MG Tab; TAKE ONE&ONE HALF (1&1/2) TABS BY MOUTH TWICE DAILY WITH FOOD FOR SEIZURES  MULTIPLE BOTTLES  Dispense: 270 tablet; Refill: 0          No results found for this or any previous visit (from the past 24 hour(s)).    Follow Up:  Follow up in about 6 weeks (around 5/27/2024).      Discussed the diagnosis, prognosis, plan of care, chronic nature of and indications for, risks and benefits of treatment for conditions.  Continue all medications as prescribed unless otherwise noted.   Call if patient develops other symptoms or if not better in 2-3 days and sooner if worse. Emphasized the importance of compliance with all recommendations, including medication use and  timely follow up. Instructed to return as noted be or sooner if patient develops any other problems or if there are any other additional questions or concerns.

## 2024-04-15 NOTE — ASSESSMENT & PLAN NOTE
Creon 36K 2 cap at noon with food and tolerating this without GI upset.  Had seen GI NP within the last year and she change the medication to Zenpep but it caused increased abdominal discomfort and tried for 1-1/2 prescriptions before he stopped and resume the Creon.  With the Creon just 2 tablets daily he is tolerating his food and feeling much better please send copy to Dr. Bryant

## 2024-04-15 NOTE — PROGRESS NOTES
"Subjective:       Patient ID: Phan Saenz is a 59 y.o. male.    Chief Complaint: Seizures and Heartburn    HPI  Review of Systems      Objective:      Vitals:    04/15/24 1410   BP: 126/71   Pulse: 73   Resp: 20   Temp: 97.2 °F (36.2 °C)   SpO2: 97%   Weight: 88.9 kg (196 lb)   Height: 5' 9" (1.753 m)       Physical Exam    Assessment/Plan:     1. Vitamin D deficiency  Assessment & Plan:  Last check was 38 but currently vitamin-D is only 19.  Goal between.  45-65 vitamin D 50,000 weekly and recheck bmp, vitamin D in 4 months.       2. Negron's esophagus without dysplasia  Overview:  Dr Bryant visit continue pantoprazole 40 mg daily. F/u 1 year if needed sooner    Assessment & Plan:  Patient recently has had an appointment with Dr. 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 Protonix 40 mg daily with bland diet and wait 2-3 hours after last meal before lying down.  Dexilant 60 worked much better but can not afford pay for it.    Orders:  -     pantoprazole (PROTONIX) 40 MG tablet; Take 1 tablet (40 mg total) by mouth once daily.  Dispense: 30 tablet; Refill: 2    3. Exocrine pancreatic insufficiency  Overview:  Dr Bryant, exocrine pancreatic insufficiency; likely 2/2 chronic alcohol abuse, ?, gastroparesis started creon 36K3 cap with food intake    Assessment & Plan:  Creon 36K 2 cap at noon with food and tolerating this without GI upset.  Had seen GI NP within the last year and she change the medication to Zenpep but it caused increased abdominal discomfort and tried for 1-1/2 prescriptions before he stopped and resume the Creon.  With the Creon just 2 tablets daily he is tolerating his food and feeling much better please send copy to Dr. Bryant    Orders:  -     CREON 36,000-114,000- 180,000 unit CpDR; Take 2 capsules by mouth Daily.  Dispense: 180 capsule; Refill: 1    4. Other generalized epilepsy, not intractable, without status epilepticus  -     levETIRAcetam (KEPPRA) 500 MG " Tab; TAKE ONE&ONE HALF (1&1/2) TABS BY MOUTH TWICE DAILY WITH FOOD FOR SEIZURES  MULTIPLE BOTTLES  Dispense: 270 tablet; Refill: 0       Follow up in about 6 weeks (around 5/27/2024).          Discussed the diagnosis, prognosis, plan of care, chronic nature of and indications for, risks and benefits of treatment for conditions.  Continue all medications as prescribed unless otherwise noted.   Call if patient develops other symptoms or if not better in 2-3 days and sooner if worse. Emphasized the importance of compliance with all recommendations, including medication use and timely follow up. Instructed to return as noted be or sooner if patient develops any other problems or if there are any other additional questions or concerns.

## 2024-05-06 PROCEDURE — 85025 COMPLETE CBC W/AUTO DIFF WBC: CPT | Performed by: NURSE PRACTITIONER

## 2024-05-06 PROCEDURE — 80177 DRUG SCRN QUAN LEVETIRACETAM: CPT | Performed by: NURSE PRACTITIONER

## 2024-05-06 PROCEDURE — 82306 VITAMIN D 25 HYDROXY: CPT | Performed by: NURSE PRACTITIONER

## 2024-05-06 PROCEDURE — 80061 LIPID PANEL: CPT | Performed by: NURSE PRACTITIONER

## 2024-05-06 PROCEDURE — 83036 HEMOGLOBIN GLYCOSYLATED A1C: CPT | Performed by: NURSE PRACTITIONER

## 2024-05-06 PROCEDURE — 80053 COMPREHEN METABOLIC PANEL: CPT | Performed by: NURSE PRACTITIONER

## 2024-05-06 PROCEDURE — 84550 ASSAY OF BLOOD/URIC ACID: CPT | Performed by: NURSE PRACTITIONER

## 2024-05-06 PROCEDURE — 82728 ASSAY OF FERRITIN: CPT | Performed by: NURSE PRACTITIONER

## 2024-05-06 PROCEDURE — 82248 BILIRUBIN DIRECT: CPT | Performed by: NURSE PRACTITIONER

## 2024-05-07 ENCOUNTER — OFFICE VISIT (OUTPATIENT)
Dept: FAMILY MEDICINE | Facility: CLINIC | Age: 59
End: 2024-05-07
Payer: MEDICARE

## 2024-05-07 VITALS
DIASTOLIC BLOOD PRESSURE: 71 MMHG | OXYGEN SATURATION: 96 % | BODY MASS INDEX: 30.81 KG/M2 | SYSTOLIC BLOOD PRESSURE: 122 MMHG | HEIGHT: 69 IN | WEIGHT: 208 LBS | TEMPERATURE: 98 F | RESPIRATION RATE: 20 BRPM | HEART RATE: 69 BPM

## 2024-05-07 DIAGNOSIS — E78.5 HYPERLIPIDEMIA, UNSPECIFIED HYPERLIPIDEMIA TYPE: ICD-10-CM

## 2024-05-07 DIAGNOSIS — M10.9 GOUTY ARTHRITIS OF LEFT GREAT TOE: ICD-10-CM

## 2024-05-07 DIAGNOSIS — E79.0 HYPERURICEMIA W/O SIGNS OF INFLAM ARTHRIT AND TOPHACEOUS DIS: Primary | ICD-10-CM

## 2024-05-07 DIAGNOSIS — E55.9 VITAMIN D DEFICIENCY: ICD-10-CM

## 2024-05-07 DIAGNOSIS — D51.8 DIETARY VITAMIN B12 DEFICIENCY ANEMIA: ICD-10-CM

## 2024-05-07 DIAGNOSIS — K59.00 CONSTIPATION, UNSPECIFIED CONSTIPATION TYPE: ICD-10-CM

## 2024-05-07 DIAGNOSIS — E87.6 HYPOKALEMIA: ICD-10-CM

## 2024-05-07 DIAGNOSIS — G40.409 OTHER GENERALIZED EPILEPSY, NOT INTRACTABLE, WITHOUT STATUS EPILEPTICUS: ICD-10-CM

## 2024-05-07 DIAGNOSIS — E78.2 MIXED HYPERLIPIDEMIA: ICD-10-CM

## 2024-05-07 PROCEDURE — 99214 OFFICE O/P EST MOD 30 MIN: CPT | Mod: ,,, | Performed by: NURSE PRACTITIONER

## 2024-05-07 RX ORDER — LEVETIRACETAM 500 MG/1
TABLET ORAL
Qty: 270 TABLET | Refills: 0 | Status: SHIPPED | OUTPATIENT
Start: 2024-05-07

## 2024-05-07 RX ORDER — ROSUVASTATIN CALCIUM 10 MG/1
TABLET, COATED ORAL
Qty: 90 TABLET | Refills: 3 | Status: SHIPPED | OUTPATIENT
Start: 2024-05-07

## 2024-05-07 RX ORDER — POTASSIUM CHLORIDE 750 MG/1
10 TABLET, EXTENDED RELEASE ORAL 2 TIMES DAILY
Qty: 90 TABLET | Refills: 1 | Status: SHIPPED | OUTPATIENT
Start: 2024-05-07

## 2024-05-07 RX ORDER — ICOSAPENT ETHYL 1 G/1
CAPSULE ORAL
Qty: 360 CAPSULE | Refills: 1 | Status: SHIPPED | OUTPATIENT
Start: 2024-05-07

## 2024-05-07 RX ORDER — ALLOPURINOL 300 MG/1
300 TABLET ORAL DAILY
Qty: 90 TABLET | Refills: 1 | Status: SHIPPED | OUTPATIENT
Start: 2024-05-07

## 2024-05-07 RX ORDER — CYANOCOBALAMIN 1000 UG/ML
1000 INJECTION, SOLUTION INTRAMUSCULAR; SUBCUTANEOUS
Qty: 10 ML | Refills: 2 | Status: SHIPPED | OUTPATIENT
Start: 2024-05-07

## 2024-05-07 RX ORDER — ASPIRIN 325 MG
50000 TABLET, DELAYED RELEASE (ENTERIC COATED) ORAL
Qty: 13 CAPSULE | Refills: 1 | Status: SHIPPED | OUTPATIENT
Start: 2024-05-07

## 2024-05-07 NOTE — ASSESSMENT & PLAN NOTE
Copntinue supplement with vitamin B12  1000, 1000 mg/ml every 2 weeks. Recheck cbc, b12 ferritin in 3 months.

## 2024-05-07 NOTE — PROGRESS NOTES
"SUBJECTIVE:  Phan Saenz is a 59 y.o. male here for review labs and Gastroesophageal Reflux      HPI  Pt in clinic for a 6wk f/u on protonix. Pt reports that all meds have been working well. Some constipation and inicrease linzess. No gout or idney stones. Drinking 2 liters coke day for last year since stopped drinking alcohol. No seizures. Feeling doing well otherwise    Sahils allergies, medications, history, and problem list were updated as appropriate.    Review of Systems   Constitutional:  Positive for fatigue.   HENT: Negative.     Respiratory:  Positive for cough.    Cardiovascular: Negative.    Gastrointestinal:  Positive for abdominal pain (right lower abdominal area) and constipation.   Endocrine: Negative.    Genitourinary: Negative.    Musculoskeletal:  Positive for arthralgias and gait problem.   Allergic/Immunologic: Positive for environmental allergies.   Neurological:  Positive for weakness (right sided).   Psychiatric/Behavioral: Negative.        A comprehensive review of symptoms was completed and negative except as noted above.    OBJECTIVE:  Vital signs  Vitals:    05/07/24 1449   BP: 122/71   BP Location: Left arm   Patient Position: Sitting   Pulse: 69   Resp: 20   Temp: 98.3 °F (36.8 °C)   SpO2: 96%   Weight: 94.3 kg (208 lb)   Height: 5' 9" (1.753 m)        Physical Exam  Vitals and nursing note reviewed.   Constitutional:       General: He is not in acute distress.     Appearance: Normal appearance. He is not ill-appearing.   HENT:      Mouth/Throat:      Mouth: Mucous membranes are moist.   Cardiovascular:      Rate and Rhythm: Normal rate and regular rhythm.      Pulses: Normal pulses.      Heart sounds: Normal heart sounds. No murmur heard.     No friction rub. No gallop.   Pulmonary:      Effort: No respiratory distress.      Breath sounds: No wheezing, rhonchi or rales.   Abdominal:      General: Bowel sounds are increased.      Palpations: Abdomen is soft.      Tenderness: " There is no abdominal tenderness.   Musculoskeletal:         General: No swelling (left first metatarsal joint erythema and tenderness with edema. limited flexion of left great toe.) or tenderness.      Right lower leg: No edema.      Left lower leg: No edema.   Skin:     General: Skin is warm and dry.      Capillary Refill: Capillary refill takes 2 to 3 seconds.      Findings: No abrasion.          Neurological:      General: No focal deficit present.      Mental Status: He is alert.      Motor: Weakness present.      Comments: Right sided weakness to upper and lower extremity, walking with limp   Psychiatric:         Mood and Affect: Mood normal.         Behavior: Behavior normal.          No visits with results within 1 Day(s) from this visit.   Latest known visit with results is:   Lab Visit on 05/06/2024   Component Date Value Ref Range Status    Levetiracetam, S 05/06/2024 22.3  10.0 - 40.0 mcg/mL Final       -------------------ADDITIONAL INFORMATION-------------------  This test was developed and its performance characteristics   determined by [facility] in a manner consistent with CLIA   requirements. This test has not been cleared or approved by   the U.S. Food and Drug Administration.     Test Performed by:  [facility]  [address] [place] [address]  : Jean-Paul Wen M.D. Ph.D.; CLIA# [id number]    Ferritin Level 05/06/2024 7.32 (L)  17.9 - 464 ng/mL Final    Cholesterol Total 05/06/2024 119  0 - 200 mg/dL Final    HDL Cholesterol 05/06/2024 38 (L)  40 - 60 mg/dL Final    Triglyceride 05/06/2024 145  30 - 200 mg/dL Final    LDL Cholesterol Direct 05/06/2024 61.3  30.0 - 100.0 mg/dL Final    Vit D 25 OH 05/06/2024 19.1 (L)  30.0 - 80.0 ng/mL Final    Sodium Level 05/06/2024 142  135 - 145 mmol/L Final    Potassium Level 05/06/2024 3.7  3.5 - 5.1 mmol/L Final    Chloride 05/06/2024 115 (H)  98 - 110 mmol/L Final    Carbon Dioxide  05/06/2024 19 (L)  21 - 32 mmol/L Final    Glucose Level 05/06/2024 101  70 - 115 mg/dL Final    Blood Urea Nitrogen 05/06/2024 6.0 (L)  7.0 - 20.0 mg/dL Final    Creatinine 05/06/2024 0.71  0.66 - 1.25 mg/dL Final    Calcium Level Total 05/06/2024 8.9  8.4 - 10.2 mg/dL Final    Protein Total 05/06/2024 6.5  6.3 - 8.2 gm/dL Final    Albumin Level 05/06/2024 3.9  3.4 - 5.0 g/dL Final    Globulin 05/06/2024 2.6  2.0 - 3.9 gm/dL Final    Albumin/Globulin Ratio 05/06/2024 1.5  ratio Final    Bilirubin Total 05/06/2024 0.2  0.0 - 1.0 mg/dL Final    Alkaline Phosphatase 05/06/2024 72  50 - 144 unit/L Final    Alanine Aminotransferase 05/06/2024 17  1 - 45 unit/L Final    Aspartate Aminotransferase 05/06/2024 20  17 - 59 unit/L Final    eGFR 05/06/2024 >90  mls/min/1.73/m2 Final                         EGFR INTERPRETATION    Beginning 8/15/22 we are reporting the eGFRcr calculation as recommended by the National Kidney Foundation. The eGFRcr equation has similar overall performance characteristics to the older equation, but the values may differ by more than 10% particularly at higher values of eGFRcr and younger adult ages.    NKF stages of chronic kidney disease (CKD)  Stage 1: Kidney damage with normal or increased eGFR (>90 mL/min/1.73 m^2)  Stage 2: Mild reduction in GFR (60-89 mL/min/1.73 m^2)  Stage 3a: Moderate reduction in GFR (45-59 mL/min/1.73 m^2)  Stage 3b: Moderate reduction in GFR (30-44 mL/min/1.73 m^2)  Stage 4: Severe reduction in GFR (15-29 mL/min/1.73 m^2)  Stage 5: Kidney failure (GFR <15 mL/min/1.73 m^2)        Anion Gap 05/06/2024 8.0  2.0 - 13.0 mEq/L Final    BUN/Creatinine Ratio 05/06/2024 8 (L)  12 - 20 Final    Uric Acid 05/06/2024 7.6 (H)  2.6 - 7.2 mg/dL Final    Hemoglobin A1c 05/06/2024 5.4  4.0 - 6.0 % Final    Estimated Average Glucose 05/06/2024 108.3  70.0 - 115.0 mg/dL Final    WBC 05/06/2024 5.50  4.00 - 11.50 x10(3)/mcL Final    RBC 05/06/2024 3.70 (L)  4.00 - 6.00 x10(6)/mcL Final     Hgb 05/06/2024 11.7 (L)  13.0 - 18.0 g/dL Final    Hct 05/06/2024 33.4 (L)  36.0 - 52.0 % Final    MCV 05/06/2024 90.3  79.0 - 99.0 fL Final    MCH 05/06/2024 31.6  27.0 - 34.0 pg Final    MCHC 05/06/2024 35.0  31.0 - 37.0 g/dL Final    RDW 05/06/2024 15.2  % Final    Platelet 05/06/2024 283  140 - 371 x10(3)/mcL Final    MPV 05/06/2024 10.0  9.4 - 12.4 fL Final    Neut % 05/06/2024 45.8  37 - 73 % Final    Lymph % 05/06/2024 37.8  20 - 55 % Final    Mono % 05/06/2024 7.1  4.7 - 12.5 % Final    Eos % 05/06/2024 7.8 (H)  0.7 - 7 % Final    Basophil % 05/06/2024 1.1  0.1 - 1.2 % Final    Lymph # 05/06/2024 2.08  1.32 - 3.57 x10(3)/mcL Final    Neut # 05/06/2024 2.52  1.78 - 5.38 x10(3)/mcL Final    Mono # 05/06/2024 0.39  0.3 - 0.82 x10(3)/mcL Final    Eos # 05/06/2024 0.43  0.04 - 0.54 x10(3)/mcL Final    Baso # 05/06/2024 0.06  0.01 - 0.08 x10(3)/mcL Final    IG# 05/06/2024 0.02  0.0001 - 0.031 x10(3)/mcL Final    IG% 05/06/2024 0.4  0 - 0.5 % Final    NRBC% 05/06/2024 0.0  <=1 % Final    Bilirubin Direct 05/06/2024    Final    Unable to calculate due to low unconjugated bili result          ASSESSMENT/PLAN:    1. Hyperuricemia w/o signs of inflam arthrit and tophaceous dis  Assessment & Plan:  Allopurinol 300 mg daily. Goal uric acid <6.0. low purine diet. Recheck uric acid in 3 months.       2. Dietary vitamin B12 deficiency anemia  Assessment & Plan:  Copntinue supplement with vitamin B12  1000, 1000 mg/ml every 2 weeks. Recheck cbc, b12 ferritin in 3 months.     Orders:  -     cyanocobalamin 1,000 mcg/mL injection; Inject 1 mL (1,000 mcg total) into the skin every 14 (fourteen) days.  Dispense: 10 mL; Refill: 2    3. Gouty arthritis of left great toe  -     allopurinoL (ZYLOPRIM) 300 MG tablet; Take 1 tablet (300 mg total) by mouth once daily.  Dispense: 90 tablet; Refill: 1    4. Hyperlipidemia, unspecified hyperlipidemia type  -     icosapent ethyL (VASCEPA) 1 gram Cap; See Instructions, TAKE TWO(2) CAPS BY  MOUTH TWICE DAILY FOR CHOLESTEROL ** MUTLIPLE BOTTLES **, # 360 cap(s), 1 Refill(s), Pharmacy: [facility], 177.5, cm, Height/Length Dosing, 06/29/22 9:46:00 CDT, 102.05, kg, Weight Dosing, 06/29/22 9:4...  Dispense: 360 capsule; Refill: 1    5. Other generalized epilepsy, not intractable, without status epilepticus  -     levETIRAcetam (KEPPRA) 500 MG Tab; TAKE ONE&ONE HALF (1&1/2) TABS BY MOUTH TWICE DAILY WITH FOOD FOR SEIZURES  MULTIPLE BOTTLES  Dispense: 270 tablet; Refill: 0    6. Constipation, unspecified constipation type  -     linaCLOtide (LINZESS) 145 mcg Cap capsule; TAKE ONE(1) CAP EVERY DAY 30 MINUTES BEFORE THE 1ST FIRST MEAL OF THE DAY WITH A FULL GLASS OF WATER FOR CONSTIPATION  Dispense: 30 capsule; Refill: 2    7. Hypokalemia  -     potassium chloride (KLOR-CON) 10 MEQ TbSR; Take 1 tablet (10 mEq total) by mouth 2 (two) times daily.  Dispense: 90 tablet; Refill: 1    8. Mixed hyperlipidemia  -     rosuvastatin (CRESTOR) 10 MG tablet; See Instructions, TAKE ONE(1) TAB BY MOUTH EVERY NIGHT AT BEDTIME FOR CHOLESTEROL, # 90 tab(s), 1 Refill(s), Pharmacy: [facility], 177.5, cm, Height/Length Dosing, 01/18/23 9:50:00 CST, 92.98, kg, Weight Dosing, 01/18/23 9:50:00 CST  Dispense: 90 tablet; Refill: 3    9. Vitamin D deficiency  Assessment & Plan:  Vitamin D 50,0000 once week and recheck vitamin D in 3 months.     Orders:  -     cholecalciferol, vitamin D3, 1,250 mcg (50,000 unit) capsule; Take 1 capsule (50,000 Units total) by mouth every 7 days.  Dispense: 13 capsule; Refill: 1          No results found for this or any previous visit (from the past 24 hour(s)).    Follow Up:  Follow up in about 3 months (around 8/7/2024).      Discussed the diagnosis, prognosis, plan of care, chronic nature of and indications for, risks and benefits of treatment for conditions.  Continue all medications as prescribed unless otherwise noted.   Call if patient develops other symptoms or if not better in  2-3 days and sooner if worse. Emphasized the importance of compliance with all recommendations, including medication use and timely follow up. Instructed to return as noted be or sooner if patient develops any other problems or if there are any other additional questions or concerns.

## 2024-05-13 ENCOUNTER — TELEPHONE (OUTPATIENT)
Dept: FAMILY MEDICINE | Facility: CLINIC | Age: 59
End: 2024-05-13
Payer: MEDICARE

## 2024-05-13 DIAGNOSIS — M79.2 NERVE PAIN: ICD-10-CM

## 2024-05-13 RX ORDER — GABAPENTIN 400 MG/1
CAPSULE ORAL
Qty: 90 CAPSULE | Refills: 1 | Status: SHIPPED | OUTPATIENT
Start: 2024-05-13 | End: 2024-06-17 | Stop reason: SDUPTHER

## 2024-05-13 NOTE — TELEPHONE ENCOUNTER
Spoke to [facility] this was an error they will still be in network and they can get his medicine there.

## 2024-05-13 NOTE — TELEPHONE ENCOUNTER
----- Message from Pebbles Garcia sent at 5/13/2024  1:14 PM CDT -----  Regarding: Med refill  Needs a refill on Gabapentin 400mg    [facility]    FYI  [facility] SENT A LETTER STATING AS OF 6/28/24 [facility] WILL NO LONGER BE A PART OF THEIR PHARMACY NETWORK.  SCANNED INTO MEDIA MANAGER.  PLEASE NOTE IN HIS CHART.  ON THE SECOND SCANNED PAGE IS THE PHONE AND FAX NUMBERS TO WHERE THE MEDICATION WILL NEED TO BE FILLED FOR THE MAIL ORDER

## 2024-06-17 ENCOUNTER — TELEPHONE (OUTPATIENT)
Dept: FAMILY MEDICINE | Facility: CLINIC | Age: 59
End: 2024-06-17
Payer: MEDICARE

## 2024-06-17 DIAGNOSIS — K59.09 CHRONIC CONSTIPATION: Primary | ICD-10-CM

## 2024-06-17 DIAGNOSIS — M79.2 NERVE PAIN: ICD-10-CM

## 2024-06-17 RX ORDER — LINACLOTIDE 72 UG/1
CAPSULE, GELATIN COATED ORAL
Qty: 90 CAPSULE | Refills: 1 | Status: SHIPPED | OUTPATIENT
Start: 2024-06-17

## 2024-06-17 RX ORDER — GABAPENTIN 400 MG/1
CAPSULE ORAL
Qty: 90 CAPSULE | Refills: 1 | Status: SHIPPED | OUTPATIENT
Start: 2024-06-17

## 2024-06-17 NOTE — TELEPHONE ENCOUNTER
----- Message from Lesvia Degroot sent at 6/17/2024  8:05 AM CDT -----  Patient needs refill on Gabapentin 400 mg called in to [facility]

## 2024-07-11 ENCOUNTER — TELEPHONE (OUTPATIENT)
Dept: FAMILY MEDICINE | Facility: CLINIC | Age: 59
End: 2024-07-11
Payer: MEDICARE

## 2024-07-11 DIAGNOSIS — M79.2 NERVE PAIN: ICD-10-CM

## 2024-07-11 DIAGNOSIS — G40.409 OTHER GENERALIZED EPILEPSY, NOT INTRACTABLE, WITHOUT STATUS EPILEPTICUS: ICD-10-CM

## 2024-07-11 RX ORDER — LEVETIRACETAM 500 MG/1
TABLET ORAL
Qty: 270 TABLET | Refills: 0 | Status: SHIPPED | OUTPATIENT
Start: 2024-07-11

## 2024-07-11 RX ORDER — GABAPENTIN 400 MG/1
CAPSULE ORAL
Qty: 90 CAPSULE | Refills: 1 | Status: SHIPPED | OUTPATIENT
Start: 2024-07-11

## 2024-07-11 NOTE — TELEPHONE ENCOUNTER
----- Message from Pebbles Garcia sent at 7/11/2024  8:04 AM CDT -----  Regarding: Med refill  Mr. Saenz called to say that the family is going to [place] for a couple of weeks and while they are there Phan will run out of a couple of meds.  He wants to know if he can have a script for the meds to be refilled while they are out of state.  They use [facility] currently.  Keppra  500mg  Gabapentin  400mg    Please let him know how he can make sure these can be filled while gone  [phone]

## 2024-07-11 NOTE — TELEPHONE ENCOUNTER
Send refills to [facility]. Called pt father and let him know he needed to ask [facility] if it could be filled early. Did let him know mrs henderson cannot call medicine into another state because he asked if we could do a script and he would fill it in [place]. He understood and will call [facility]

## 2024-07-12 DIAGNOSIS — F32.A DEPRESSION, UNSPECIFIED DEPRESSION TYPE: ICD-10-CM

## 2024-07-12 DIAGNOSIS — R10.9 STOMACH CRAMPS: ICD-10-CM

## 2024-07-15 RX ORDER — DICYCLOMINE HYDROCHLORIDE 20 MG/1
TABLET ORAL
Qty: 90 TABLET | Refills: 4 | Status: SHIPPED | OUTPATIENT
Start: 2024-07-15

## 2024-07-15 RX ORDER — ESCITALOPRAM OXALATE 20 MG/1
TABLET ORAL
Qty: 90 TABLET | Refills: 1 | Status: SHIPPED | OUTPATIENT
Start: 2024-07-15

## 2024-08-05 ENCOUNTER — OFFICE VISIT (OUTPATIENT)
Dept: FAMILY MEDICINE | Facility: CLINIC | Age: 59
End: 2024-08-05
Payer: MEDICARE

## 2024-08-05 VITALS
WEIGHT: 200 LBS | OXYGEN SATURATION: 97 % | BODY MASS INDEX: 29.62 KG/M2 | HEIGHT: 69 IN | TEMPERATURE: 98 F | RESPIRATION RATE: 20 BRPM | HEART RATE: 59 BPM | DIASTOLIC BLOOD PRESSURE: 71 MMHG | SYSTOLIC BLOOD PRESSURE: 118 MMHG

## 2024-08-05 DIAGNOSIS — E55.9 VITAMIN D DEFICIENCY: Primary | ICD-10-CM

## 2024-08-05 DIAGNOSIS — D51.8 DIETARY VITAMIN B12 DEFICIENCY ANEMIA: ICD-10-CM

## 2024-08-05 DIAGNOSIS — Z87.820 HISTORY OF TRAUMATIC BRAIN INJURY: ICD-10-CM

## 2024-08-05 DIAGNOSIS — E87.6 HYPOKALEMIA: ICD-10-CM

## 2024-08-05 DIAGNOSIS — Z11.4 SCREENING FOR HIV (HUMAN IMMUNODEFICIENCY VIRUS): ICD-10-CM

## 2024-08-05 DIAGNOSIS — E61.1 IRON DEFICIENCY: ICD-10-CM

## 2024-08-05 DIAGNOSIS — M54.50 LOW BACK PAIN, UNSPECIFIED BACK PAIN LATERALITY, UNSPECIFIED CHRONICITY, UNSPECIFIED WHETHER SCIATICA PRESENT: ICD-10-CM

## 2024-08-05 DIAGNOSIS — E79.0 HYPERURICEMIA: ICD-10-CM

## 2024-08-05 DIAGNOSIS — E78.2 MIXED HYPERLIPIDEMIA: ICD-10-CM

## 2024-08-05 DIAGNOSIS — Z23 IMMUNIZATION DUE: ICD-10-CM

## 2024-08-05 DIAGNOSIS — G40.909 SEIZURE DISORDER: ICD-10-CM

## 2024-08-05 LAB
25(OH)D3+25(OH)D2 SERPL-MCNC: 25 NG/ML (ref 30–80)
ALBUMIN SERPL-MCNC: 4.2 G/DL (ref 3.4–5)
ALBUMIN/GLOB SERPL: 1.5 RATIO
ALP SERPL-CCNC: 84 UNIT/L (ref 50–144)
ALT SERPL-CCNC: 13 UNIT/L (ref 1–45)
ANION GAP SERPL CALC-SCNC: 10 MEQ/L (ref 2–13)
AST SERPL-CCNC: 18 UNIT/L (ref 17–59)
BASOPHILS # BLD AUTO: 0.06 X10(3)/MCL (ref 0.01–0.08)
BASOPHILS NFR BLD AUTO: 1 % (ref 0.1–1.2)
BILIRUB SERPL-MCNC: 0.2 MG/DL (ref 0–1)
BUN SERPL-MCNC: 8 MG/DL (ref 7–20)
CALCIUM SERPL-MCNC: 9.8 MG/DL (ref 8.4–10.2)
CHLORIDE SERPL-SCNC: 112 MMOL/L (ref 98–110)
CO2 SERPL-SCNC: 22 MMOL/L (ref 21–32)
CREAT SERPL-MCNC: 0.75 MG/DL (ref 0.66–1.25)
CREAT/UREA NIT SERPL: 11 (ref 12–20)
EOSINOPHIL # BLD AUTO: 0.39 X10(3)/MCL (ref 0.04–0.54)
EOSINOPHIL NFR BLD AUTO: 6.3 % (ref 0.7–7)
ERYTHROCYTE [DISTWIDTH] IN BLOOD BY AUTOMATED COUNT: 13.6 %
FERRITIN SERPL-MCNC: 10.7 NG/ML (ref 17.9–464)
GFR SERPLBLD CREATININE-BSD FMLA CKD-EPI: >90 ML/MIN/1.73/M2
GLOBULIN SER-MCNC: 2.8 GM/DL (ref 2–3.9)
GLUCOSE SERPL-MCNC: 99 MG/DL (ref 70–115)
HCT VFR BLD AUTO: 36.5 % (ref 36–52)
HGB BLD-MCNC: 13 G/DL (ref 13–18)
HIV 1+2 AB+HIV1 P24 AG SERPL QL IA: NONREACTIVE
IMM GRANULOCYTES # BLD AUTO: 0.02 X10(3)/MCL (ref 0–0.03)
IMM GRANULOCYTES NFR BLD AUTO: 0.3 % (ref 0–0.5)
IRON SATN MFR SERPL: 20 % (ref 20–50)
IRON SERPL-MCNC: 61 UG/DL (ref 65–175)
LYMPHOCYTES # BLD AUTO: 1.78 X10(3)/MCL (ref 1.32–3.57)
LYMPHOCYTES NFR BLD AUTO: 28.8 % (ref 20–55)
MCH RBC QN AUTO: 32.8 PG (ref 27–34)
MCHC RBC AUTO-ENTMCNC: 35.6 G/DL (ref 31–37)
MCV RBC AUTO: 92.2 FL (ref 79–99)
MONOCYTES # BLD AUTO: 0.48 X10(3)/MCL (ref 0.3–0.82)
MONOCYTES NFR BLD AUTO: 7.8 % (ref 4.7–12.5)
NEUTROPHILS # BLD AUTO: 3.44 X10(3)/MCL (ref 1.78–5.38)
NEUTROPHILS NFR BLD AUTO: 55.8 % (ref 37–73)
NRBC BLD AUTO-RTO: 0 %
PLATELET # BLD AUTO: 278 X10(3)/MCL (ref 140–371)
PMV BLD AUTO: 10.4 FL (ref 9.4–12.4)
POTASSIUM SERPL-SCNC: 3.6 MMOL/L (ref 3.5–5.1)
PROT SERPL-MCNC: 7 GM/DL (ref 6.3–8.2)
RBC # BLD AUTO: 3.96 X10(6)/MCL (ref 4–6)
SODIUM SERPL-SCNC: 144 MMOL/L (ref 136–145)
TIBC SERPL-MCNC: 239 UG/DL (ref 69–240)
TIBC SERPL-MCNC: 300 UG/DL (ref 250–450)
TRANSFERRIN SERPL-MCNC: 283 MG/DL (ref 174–364)
URATE SERPL-MCNC: 7 MG/DL (ref 2.6–7.2)
VIT B12 SERPL-MCNC: >1000 PG/ML (ref 211–946)
WBC # BLD AUTO: 6.17 X10(3)/MCL (ref 4–11.5)

## 2024-08-05 PROCEDURE — G0009 ADMIN PNEUMOCOCCAL VACCINE: HCPCS | Mod: ,,, | Performed by: NURSE PRACTITIONER

## 2024-08-05 PROCEDURE — 80177 DRUG SCRN QUAN LEVETIRACETAM: CPT | Performed by: NURSE PRACTITIONER

## 2024-08-05 PROCEDURE — 82607 VITAMIN B-12: CPT | Performed by: NURSE PRACTITIONER

## 2024-08-05 PROCEDURE — 85025 COMPLETE CBC W/AUTO DIFF WBC: CPT | Performed by: NURSE PRACTITIONER

## 2024-08-05 PROCEDURE — 83540 ASSAY OF IRON: CPT | Performed by: NURSE PRACTITIONER

## 2024-08-05 PROCEDURE — 87389 HIV-1 AG W/HIV-1&-2 AB AG IA: CPT | Performed by: NURSE PRACTITIONER

## 2024-08-05 PROCEDURE — 96372 THER/PROPH/DIAG INJ SC/IM: CPT | Mod: ,,, | Performed by: NURSE PRACTITIONER

## 2024-08-05 PROCEDURE — 90677 PCV20 VACCINE IM: CPT | Mod: ,,, | Performed by: NURSE PRACTITIONER

## 2024-08-05 PROCEDURE — 84550 ASSAY OF BLOOD/URIC ACID: CPT | Performed by: NURSE PRACTITIONER

## 2024-08-05 PROCEDURE — 82728 ASSAY OF FERRITIN: CPT | Performed by: NURSE PRACTITIONER

## 2024-08-05 PROCEDURE — 83550 IRON BINDING TEST: CPT | Performed by: NURSE PRACTITIONER

## 2024-08-05 PROCEDURE — 82306 VITAMIN D 25 HYDROXY: CPT | Performed by: NURSE PRACTITIONER

## 2024-08-05 PROCEDURE — 80053 COMPREHEN METABOLIC PANEL: CPT | Performed by: NURSE PRACTITIONER

## 2024-08-05 PROCEDURE — 99214 OFFICE O/P EST MOD 30 MIN: CPT | Mod: ,,, | Performed by: NURSE PRACTITIONER

## 2024-08-05 RX ORDER — ERGOCALCIFEROL 1.25 MG/1
CAPSULE ORAL
Qty: 13 CAPSULE | Refills: 1 | Status: SHIPPED | OUTPATIENT
Start: 2024-08-05

## 2024-08-05 RX ORDER — KETOROLAC TROMETHAMINE 30 MG/ML
60 INJECTION, SOLUTION INTRAMUSCULAR; INTRAVENOUS
Status: COMPLETED | OUTPATIENT
Start: 2024-08-05 | End: 2024-08-05

## 2024-08-05 RX ADMIN — KETOROLAC TROMETHAMINE 60 MG: 30 INJECTION, SOLUTION INTRAMUSCULAR; INTRAVENOUS at 09:08

## 2024-08-06 ENCOUNTER — TELEPHONE (OUTPATIENT)
Dept: FAMILY MEDICINE | Facility: CLINIC | Age: 59
End: 2024-08-06
Payer: MEDICARE

## 2024-08-06 DIAGNOSIS — E78.2 MIXED HYPERLIPIDEMIA: Primary | ICD-10-CM

## 2024-08-06 DIAGNOSIS — I10 BENIGN ESSENTIAL HTN: ICD-10-CM

## 2024-08-06 RX ORDER — LOSARTAN POTASSIUM 50 MG/1
50 TABLET ORAL DAILY
Qty: 30 TABLET | Refills: 2 | Status: SHIPPED | OUTPATIENT
Start: 2024-08-06 | End: 2024-09-05

## 2024-08-06 RX ORDER — ATORVASTATIN CALCIUM 40 MG/1
40 TABLET, FILM COATED ORAL DAILY
Qty: 30 TABLET | Refills: 1 | Status: SHIPPED | OUTPATIENT
Start: 2024-08-06 | End: 2024-09-05

## 2024-08-07 LAB — LEVETIRACETAM SERPL-MCNC: 19.3 MCG/ML (ref 10–40)

## 2024-08-26 ENCOUNTER — TELEPHONE (OUTPATIENT)
Dept: FAMILY MEDICINE | Facility: CLINIC | Age: 59
End: 2024-08-26
Payer: MEDICARE

## 2024-08-26 DIAGNOSIS — I25.10 CORONARY ARTERY DISEASE, UNSPECIFIED VESSEL OR LESION TYPE, UNSPECIFIED WHETHER ANGINA PRESENT, UNSPECIFIED WHETHER NATIVE OR TRANSPLANTED HEART: ICD-10-CM

## 2024-08-26 DIAGNOSIS — J30.2 SEASONAL ALLERGIES: ICD-10-CM

## 2024-08-26 DIAGNOSIS — M79.2 NERVE PAIN: ICD-10-CM

## 2024-08-26 RX ORDER — CETIRIZINE HYDROCHLORIDE 10 MG/1
TABLET ORAL
Qty: 90 TABLET | Refills: 1 | Status: SHIPPED | OUTPATIENT
Start: 2024-08-26 | End: 2024-08-26 | Stop reason: SDUPTHER

## 2024-08-26 RX ORDER — GABAPENTIN 400 MG/1
CAPSULE ORAL
Qty: 90 CAPSULE | Refills: 1 | Status: SHIPPED | OUTPATIENT
Start: 2024-08-26

## 2024-08-26 RX ORDER — CLOPIDOGREL BISULFATE 75 MG/1
TABLET ORAL
Qty: 90 TABLET | Refills: 1 | Status: SHIPPED | OUTPATIENT
Start: 2024-08-26

## 2024-08-26 RX ORDER — CETIRIZINE HYDROCHLORIDE 10 MG/1
TABLET ORAL
Qty: 90 TABLET | Refills: 1 | Status: SHIPPED | OUTPATIENT
Start: 2024-08-26

## 2024-08-26 NOTE — TELEPHONE ENCOUNTER
----- Message from Pebbles Garcia sent at 8/26/2024 11:12 AM CDT -----  Regarding: Med refill  Needs a refill on Cetirizine  10mg  and Gabapentin  400mg    [facility]

## 2024-09-04 ENCOUNTER — TELEPHONE (OUTPATIENT)
Dept: FAMILY MEDICINE | Facility: CLINIC | Age: 59
End: 2024-09-04
Payer: MEDICARE

## 2024-09-04 DIAGNOSIS — J44.9 MILD CHRONIC OBSTRUCTIVE PULMONARY DISEASE: ICD-10-CM

## 2024-09-04 DIAGNOSIS — J30.2 SEASONAL ALLERGIES: ICD-10-CM

## 2024-09-04 DIAGNOSIS — E78.5 HYPERLIPIDEMIA, UNSPECIFIED HYPERLIPIDEMIA TYPE: ICD-10-CM

## 2024-09-04 DIAGNOSIS — K59.00 CONSTIPATION, UNSPECIFIED CONSTIPATION TYPE: ICD-10-CM

## 2024-09-04 RX ORDER — ICOSAPENT ETHYL 1 G/1
CAPSULE ORAL
Qty: 360 CAPSULE | Refills: 1 | Status: SHIPPED | OUTPATIENT
Start: 2024-09-04

## 2024-09-04 RX ORDER — CETIRIZINE HYDROCHLORIDE 10 MG/1
TABLET ORAL
Qty: 90 TABLET | Refills: 1 | Status: SHIPPED | OUTPATIENT
Start: 2024-09-04

## 2024-09-04 RX ORDER — UMECLIDINIUM BROMIDE AND VILANTEROL TRIFENATATE 62.5; 25 UG/1; UG/1
1 POWDER RESPIRATORY (INHALATION) DAILY
Qty: 180 EACH | Refills: 2 | Status: SHIPPED | OUTPATIENT
Start: 2024-09-04

## 2024-09-04 NOTE — TELEPHONE ENCOUNTER
----- Message from Lesvia Degroot sent at 9/4/2024  8:08 AM CDT -----  Patient needs refill on Linzess 72, Cetrizine 10 mg, Vacepa 1 gr and Anoro called in to [facility]

## 2024-09-12 ENCOUNTER — CLINICAL SUPPORT (OUTPATIENT)
Dept: FAMILY MEDICINE | Facility: CLINIC | Age: 59
End: 2024-09-12
Payer: MEDICARE

## 2024-09-12 DIAGNOSIS — Z23 FLU VACCINE NEED: Primary | ICD-10-CM

## 2024-09-20 DIAGNOSIS — I10 BENIGN ESSENTIAL HTN: ICD-10-CM

## 2024-09-20 DIAGNOSIS — E78.2 MIXED HYPERLIPIDEMIA: ICD-10-CM

## 2024-09-23 RX ORDER — LOSARTAN POTASSIUM 50 MG/1
TABLET ORAL
Qty: 30 TABLET | Refills: 2 | Status: SHIPPED | OUTPATIENT
Start: 2024-09-23

## 2024-09-23 RX ORDER — ATORVASTATIN CALCIUM 40 MG/1
TABLET, FILM COATED ORAL
Qty: 30 TABLET | Refills: 2 | Status: SHIPPED | OUTPATIENT
Start: 2024-09-23

## 2024-10-08 ENCOUNTER — OFFICE VISIT (OUTPATIENT)
Dept: FAMILY MEDICINE | Facility: CLINIC | Age: 59
End: 2024-10-08
Payer: MEDICARE

## 2024-10-08 VITALS
TEMPERATURE: 97 F | SYSTOLIC BLOOD PRESSURE: 122 MMHG | HEIGHT: 69 IN | OXYGEN SATURATION: 96 % | RESPIRATION RATE: 20 BRPM | DIASTOLIC BLOOD PRESSURE: 68 MMHG | WEIGHT: 204 LBS | BODY MASS INDEX: 30.21 KG/M2 | HEART RATE: 61 BPM

## 2024-10-08 DIAGNOSIS — T78.40XD ALLERGY, SUBSEQUENT ENCOUNTER: ICD-10-CM

## 2024-10-08 DIAGNOSIS — I10 BENIGN ESSENTIAL HTN: Primary | ICD-10-CM

## 2024-10-08 DIAGNOSIS — J31.0 CHRONIC RHINITIS: ICD-10-CM

## 2024-10-08 DIAGNOSIS — B35.4 TINEA CORPORIS: ICD-10-CM

## 2024-10-08 DIAGNOSIS — K59.09 CHRONIC CONSTIPATION: ICD-10-CM

## 2024-10-08 PROCEDURE — 99213 OFFICE O/P EST LOW 20 MIN: CPT | Mod: ,,, | Performed by: NURSE PRACTITIONER

## 2024-10-08 RX ORDER — ROSUVASTATIN CALCIUM 10 MG/1
1 TABLET, COATED ORAL NIGHTLY
COMMUNITY

## 2024-10-08 RX ORDER — AZELASTINE 1 MG/ML
1 SPRAY, METERED NASAL 2 TIMES DAILY
Qty: 90 ML | Refills: 0 | Status: SHIPPED | OUTPATIENT
Start: 2024-10-08

## 2024-10-08 RX ORDER — CYANOCOBALAMIN/FOLIC ACID 1MG-400MCG
1 LOZENGE SUBLINGUAL DAILY
COMMUNITY

## 2024-10-08 RX ORDER — AMLODIPINE AND BENAZEPRIL HYDROCHLORIDE 10; 20 MG/1; MG/1
1 CAPSULE ORAL DAILY
Qty: 90 CAPSULE | Refills: 1 | Status: SHIPPED | OUTPATIENT
Start: 2024-10-08

## 2024-10-08 RX ORDER — ASPIRIN 81 MG/1
1 TABLET ORAL DAILY
COMMUNITY

## 2024-10-08 RX ORDER — AMLODIPINE AND BENAZEPRIL HYDROCHLORIDE 10; 20 MG/1; MG/1
1 CAPSULE ORAL DAILY
COMMUNITY
End: 2024-10-08 | Stop reason: SDUPTHER

## 2024-10-08 NOTE — PROGRESS NOTES
Patient ID: Phan Saenz  : 1965     Chief Complaint: Hypertension    Allergies: Patient has No Known Allergies.     History of Present Illness:  The patient is a 59 y.o. White male who presents to clinic for evaluation and management with a chief complaint of Hypertension   HPI   Patient in clinic with follow-up on blood pressure. Patient was started on lotrel. Patients father states that since started on medication. Patients blood pressure has been normal. Slight itching in umbilicus intermittently. Denied headaches or side effects.     Past Medical History:  has a past medical history of Alcohol dependence with intoxication, unspecified, Arthropathy, unspecified, Benign essential HTN, Chronic constipation, COPD (chronic obstructive pulmonary disease), Coronary arteriosclerosis, Erosive esophagitis, GERD (gastroesophageal reflux disease), H/O metabolic and nutritional disorder, Harmful pattern of use of nicotine, Hemiplegia, History of iron deficiency, History of traumatic brain injury, Hypokalemia, Major depressive disorder, single episode, unspecified, Mild chronic obstructive pulmonary disease, Mixed anxiety and depressive disorder, Mixed hyperlipidemia, Neuralgia, Nicotine dependence, Obstructive sleep apnea syndrome, Other seasonal allergic rhinitis, Seizure disorder, Tobacco user, Unspecified viral hepatitis C without hepatic coma, Vascular insufficiency, Vitamin B12 deficiency, and Vitamin D deficiency.    Social History:  reports that he has been smoking cigarettes. He has been exposed to tobacco smoke. He uses smokeless tobacco. He reports current alcohol use. He reports that he does not use drugs.    Care Team: Patient Care Team:  Vanessa Kincaid DNP as PCP - General (Family Medicine)  Su Allen FNP as Nurse Practitioner (Cardiology)       Review of Systems   Constitutional: Negative.    HENT:  Positive for postnasal drip.    Respiratory: Negative.     Cardiovascular: Negative.   "  Endocrine: Negative.    Genitourinary: Negative.    Musculoskeletal:  Positive for arthralgias and gait problem.   Integumentary:  Positive for rash (umbilicus).   Allergic/Immunologic: Positive for environmental allergies.   Neurological:  Positive for coordination difficulties. Negative for light-headedness.   Hematological: Negative.    Psychiatric/Behavioral: Negative.          Visit Vitals  /68 (BP Location: Left arm, Patient Position: Sitting)   Pulse 61   Temp 97.3 °F (36.3 °C)   Resp 20   Ht 5' 9" (1.753 m)   Wt 92.5 kg (204 lb)   SpO2 96%   BMI 30.13 kg/m²       Physical Exam  Vitals and nursing note reviewed.   Constitutional:       General: He is not in acute distress.     Appearance: He is not ill-appearing.   HENT:      Head: Normocephalic and atraumatic.      Mouth/Throat:      Mouth: Mucous membranes are moist.      Pharynx: Oropharynx is clear.   Eyes:      General: No scleral icterus.     Extraocular Movements: Extraocular movements intact.      Conjunctiva/sclera: Conjunctivae normal.      Pupils: Pupils are equal, round, and reactive to light.   Neck:      Vascular: No carotid bruit.   Cardiovascular:      Rate and Rhythm: Normal rate and regular rhythm.      Pulses: Normal pulses.      Heart sounds: Normal heart sounds. No murmur heard.     No friction rub. No gallop.   Pulmonary:      Effort: Pulmonary effort is normal. No respiratory distress.      Breath sounds: Normal breath sounds. No wheezing, rhonchi or rales.   Abdominal:      General: Abdomen is flat. Bowel sounds are normal. There is no distension.      Palpations: Abdomen is soft. There is no mass.      Tenderness: There is no abdominal tenderness.   Musculoskeletal:      Cervical back: Normal range of motion and neck supple.   Skin:     General: Skin is warm and dry.      Findings: Erythema and rash present.          Neurological:      General: No focal deficit present.      Mental Status: He is alert and oriented to person, " place, and time.      Comments: Right sided weakness   Psychiatric:         Mood and Affect: Mood normal.          Assessment & Plan:  1. Benign essential HTN  Assessment & Plan:  Lotrel 10/20 one daily, blood pressure well controlled. The current medical regimen is effective;  continue present plan and medications.       Orders:  -     amlodipine-benazepril 10-20mg (LOTREL) 10-20 mg per capsule; Take 1 capsule by mouth once daily.  Dispense: 90 capsule; Refill: 1    2. Chronic constipation  Assessment & Plan:  Linzess 145 mg daily working well. The current medical regimen is effective;  continue present plan and medications.        3. Tinea corporis  Assessment & Plan:  Lamisil topical bid for rash. Notify any changes or problems.       4. Allergy, subsequent encounter  -     azelastine (ASTELIN) 137 mcg (0.1 %) nasal spray; 1 spray (137 mcg total) by Nasal route 2 (two) times daily.  Dispense: 90 mL; Refill: 0    5. Chronic rhinitis  Assessment & Plan:  Astelin nasal spray 1 spray prn bid. Nasal rinse prior to astelin.            Future Appointments   Date Time Provider Department Center   11/25/2024  7:25 AM [facility]       Follow up in about 6 weeks (around 11/19/2024). Call sooner if needed.    Vanessa Kincaid DNP

## 2024-10-08 NOTE — ASSESSMENT & PLAN NOTE
Linzess 145 mg daily working well. The current medical regimen is effective;  continue present plan and medications.

## 2024-10-29 DIAGNOSIS — K59.00 CONSTIPATION, UNSPECIFIED CONSTIPATION TYPE: ICD-10-CM

## 2024-10-29 DIAGNOSIS — E55.9 VITAMIN D DEFICIENCY: ICD-10-CM

## 2024-10-29 RX ORDER — LINACLOTIDE 145 UG/1
CAPSULE, GELATIN COATED ORAL
Qty: 30 CAPSULE | Refills: 2 | Status: SHIPPED | OUTPATIENT
Start: 2024-10-29

## 2024-10-29 RX ORDER — ASPIRIN 325 MG
50000 TABLET, DELAYED RELEASE (ENTERIC COATED) ORAL
Qty: 13 CAPSULE | Refills: 1 | Status: SHIPPED | OUTPATIENT
Start: 2024-10-29

## 2024-11-25 ENCOUNTER — TELEPHONE (OUTPATIENT)
Dept: FAMILY MEDICINE | Facility: CLINIC | Age: 59
End: 2024-11-25

## 2024-11-25 DIAGNOSIS — K59.00 CONSTIPATION, UNSPECIFIED CONSTIPATION TYPE: ICD-10-CM

## 2024-11-25 DIAGNOSIS — M79.2 NERVE PAIN: ICD-10-CM

## 2024-11-25 DIAGNOSIS — D51.8 DIETARY VITAMIN B12 DEFICIENCY ANEMIA: Primary | ICD-10-CM

## 2024-11-25 PROCEDURE — 84550 ASSAY OF BLOOD/URIC ACID: CPT | Performed by: NURSE PRACTITIONER

## 2024-11-25 PROCEDURE — 82306 VITAMIN D 25 HYDROXY: CPT | Performed by: NURSE PRACTITIONER

## 2024-11-25 PROCEDURE — 80053 COMPREHEN METABOLIC PANEL: CPT | Performed by: NURSE PRACTITIONER

## 2024-11-25 PROCEDURE — 80177 DRUG SCRN QUAN LEVETIRACETAM: CPT | Performed by: NURSE PRACTITIONER

## 2024-11-25 PROCEDURE — 80061 LIPID PANEL: CPT | Performed by: NURSE PRACTITIONER

## 2024-11-25 RX ORDER — GABAPENTIN 400 MG/1
CAPSULE ORAL
Qty: 90 CAPSULE | Refills: 1 | Status: SHIPPED | OUTPATIENT
Start: 2024-11-25

## 2024-11-25 RX ORDER — CYANOCOBALAMIN/FOLIC ACID 1MG-400MCG
1 LOZENGE SUBLINGUAL DAILY
Qty: 30 LOZENGE | Refills: 2 | Status: SHIPPED | OUTPATIENT
Start: 2024-11-25

## 2024-11-25 NOTE — TELEPHONE ENCOUNTER
----- Message from Lesvia sent at 11/25/2024  9:40 AM CST -----  Patient needs refill on Vit B-12, Linzess 145 mcg and Gabapentin 400 mg called in to [facility]

## 2024-11-27 ENCOUNTER — OFFICE VISIT (OUTPATIENT)
Dept: FAMILY MEDICINE | Facility: CLINIC | Age: 59
End: 2024-11-27
Payer: MEDICARE

## 2024-11-27 VITALS
RESPIRATION RATE: 20 BRPM | HEIGHT: 69 IN | BODY MASS INDEX: 29.18 KG/M2 | TEMPERATURE: 98 F | OXYGEN SATURATION: 98 % | DIASTOLIC BLOOD PRESSURE: 71 MMHG | HEART RATE: 62 BPM | SYSTOLIC BLOOD PRESSURE: 121 MMHG | WEIGHT: 197 LBS

## 2024-11-27 DIAGNOSIS — L21.9 SEBORRHEIC DERMATITIS: ICD-10-CM

## 2024-11-27 DIAGNOSIS — K59.09 CHRONIC CONSTIPATION: ICD-10-CM

## 2024-11-27 DIAGNOSIS — E78.2 MIXED HYPERLIPIDEMIA: ICD-10-CM

## 2024-11-27 DIAGNOSIS — J44.9 MILD CHRONIC OBSTRUCTIVE PULMONARY DISEASE: ICD-10-CM

## 2024-11-27 DIAGNOSIS — I25.10 CORONARY ARTERY DISEASE, UNSPECIFIED VESSEL OR LESION TYPE, UNSPECIFIED WHETHER ANGINA PRESENT, UNSPECIFIED WHETHER NATIVE OR TRANSPLANTED HEART: ICD-10-CM

## 2024-11-27 DIAGNOSIS — E79.0 HYPERURICEMIA: ICD-10-CM

## 2024-11-27 DIAGNOSIS — G81.90 HEMIPLEGIA, UNSPECIFIED ETIOLOGY, UNSPECIFIED HEMIPLEGIA TYPE, UNSPECIFIED LATERALITY: ICD-10-CM

## 2024-11-27 DIAGNOSIS — F32.5 MAJOR DEPRESSIVE DISORDER WITH SINGLE EPISODE, IN FULL REMISSION: ICD-10-CM

## 2024-11-27 DIAGNOSIS — I10 BENIGN ESSENTIAL HTN: Primary | ICD-10-CM

## 2024-11-27 DIAGNOSIS — K86.81 EXOCRINE PANCREATIC INSUFFICIENCY: ICD-10-CM

## 2024-11-27 DIAGNOSIS — K59.00 CONSTIPATION, UNSPECIFIED CONSTIPATION TYPE: ICD-10-CM

## 2024-11-27 DIAGNOSIS — G40.409 OTHER GENERALIZED EPILEPSY, NOT INTRACTABLE, WITHOUT STATUS EPILEPTICUS: ICD-10-CM

## 2024-11-27 DIAGNOSIS — K22.70 BARRETT'S ESOPHAGUS WITHOUT DYSPLASIA: ICD-10-CM

## 2024-11-27 DIAGNOSIS — G40.909 SEIZURE DISORDER: ICD-10-CM

## 2024-11-27 DIAGNOSIS — E87.6 HYPOKALEMIA: ICD-10-CM

## 2024-11-27 DIAGNOSIS — J30.2 SEASONAL ALLERGIES: ICD-10-CM

## 2024-11-27 DIAGNOSIS — E55.9 VITAMIN D DEFICIENCY: ICD-10-CM

## 2024-11-27 DIAGNOSIS — F32.A DEPRESSION, UNSPECIFIED DEPRESSION TYPE: ICD-10-CM

## 2024-11-27 DIAGNOSIS — I70.0 AORTIC ATHEROSCLEROSIS: ICD-10-CM

## 2024-11-27 DIAGNOSIS — R52 PAIN: ICD-10-CM

## 2024-11-27 PROCEDURE — 99214 OFFICE O/P EST MOD 30 MIN: CPT | Mod: ,,, | Performed by: NURSE PRACTITIONER

## 2024-11-27 RX ORDER — PANTOPRAZOLE SODIUM 40 MG/1
40 TABLET, DELAYED RELEASE ORAL DAILY
Qty: 30 TABLET | Refills: 2 | Status: SHIPPED | OUTPATIENT
Start: 2024-11-27

## 2024-11-27 RX ORDER — LEVETIRACETAM 500 MG/1
TABLET ORAL
Qty: 270 TABLET | Refills: 0 | Status: SHIPPED | OUTPATIENT
Start: 2024-11-27

## 2024-11-27 RX ORDER — ESCITALOPRAM OXALATE 20 MG/1
TABLET ORAL
Qty: 90 TABLET | Refills: 1 | Status: SHIPPED | OUTPATIENT
Start: 2024-11-27

## 2024-11-27 RX ORDER — CLOPIDOGREL BISULFATE 75 MG/1
TABLET ORAL
Qty: 90 TABLET | Refills: 1 | Status: SHIPPED | OUTPATIENT
Start: 2024-11-27

## 2024-11-27 RX ORDER — ROSUVASTATIN CALCIUM 10 MG/1
10 TABLET, COATED ORAL NIGHTLY
Qty: 90 TABLET | Refills: 1 | Status: SHIPPED | OUTPATIENT
Start: 2024-11-27

## 2024-11-27 RX ORDER — ASPIRIN 81 MG/1
81 TABLET ORAL DAILY
Qty: 90 TABLET | Refills: 1 | Status: SHIPPED | OUTPATIENT
Start: 2024-11-27

## 2024-11-27 RX ORDER — KETOCONAZOLE 20 MG/G
CREAM TOPICAL DAILY
Qty: 60 G | Refills: 2 | Status: SHIPPED | OUTPATIENT
Start: 2024-11-27

## 2024-11-27 RX ORDER — CETIRIZINE HYDROCHLORIDE 10 MG/1
TABLET ORAL
Qty: 90 TABLET | Refills: 1 | Status: SHIPPED | OUTPATIENT
Start: 2024-11-27

## 2024-11-27 RX ORDER — AMLODIPINE AND BENAZEPRIL HYDROCHLORIDE 10; 20 MG/1; MG/1
1 CAPSULE ORAL DAILY
Qty: 90 CAPSULE | Refills: 1 | Status: SHIPPED | OUTPATIENT
Start: 2024-11-27

## 2024-11-27 RX ORDER — ALLOPURINOL 300 MG/1
300 TABLET ORAL DAILY
Qty: 90 TABLET | Refills: 1 | Status: SHIPPED | OUTPATIENT
Start: 2024-11-27

## 2024-11-27 RX ORDER — ASPIRIN 325 MG
50000 TABLET, DELAYED RELEASE (ENTERIC COATED) ORAL
Qty: 26 CAPSULE | Refills: 1 | Status: SHIPPED | OUTPATIENT
Start: 2024-11-28

## 2024-11-27 RX ORDER — POTASSIUM CHLORIDE 750 MG/1
10 TABLET, EXTENDED RELEASE ORAL 2 TIMES DAILY
Qty: 90 TABLET | Refills: 1 | Status: SHIPPED | OUTPATIENT
Start: 2024-11-27

## 2024-11-27 RX ORDER — PANCRELIPASE 36000; 180000; 114000 [USP'U]/1; [USP'U]/1; [USP'U]/1
2 CAPSULE, DELAYED RELEASE PELLETS ORAL DAILY
Qty: 180 CAPSULE | Refills: 1 | Status: SHIPPED | OUTPATIENT
Start: 2024-11-27

## 2024-11-27 RX ORDER — ICOSAPENT ETHYL 1 G/1
CAPSULE ORAL
Qty: 360 CAPSULE | Refills: 1 | Status: SHIPPED | OUTPATIENT
Start: 2024-11-27

## 2024-11-27 RX ORDER — CELECOXIB 200 MG/1
CAPSULE ORAL
Qty: 90 CAPSULE | Refills: 3 | Status: SHIPPED | OUTPATIENT
Start: 2024-11-27

## 2024-11-27 NOTE — ASSESSMENT & PLAN NOTE
allopurinol 300 mg daily denies any signs gout or arthritis or kidney stones at this time negative for blood in urine or pain with urination. We will check lab and notify when available continue medication until that time    None

## 2024-11-27 NOTE — ASSESSMENT & PLAN NOTE
Lexapro 20 mg daily . The current medical regimen is effective;  continue present plan and medications.

## 2024-11-27 NOTE — ASSESSMENT & PLAN NOTE
Patient counseled on diet with low carb low-fat low triglyceride. Of great concern is 12 cokes daily discussed the potential complications that could develop he understands these risks but declines to make any changes at this time. He is taking vascepa 1 gm bid, crestor 10 mg qhs

## 2024-11-27 NOTE — PROGRESS NOTES
Patient ID: Phan Saenz  : 1965     Chief Complaint: review labs    Allergies: Patient has No Known Allergies.     History of Present Illness:  The patient is a 59 y.o. White male who presents to clinic for evaluation and management with a chief complaint of review labs   HPI   Patient in clinic to review labs. Patient states that he has been taking all medications as prescribed. Still not ready to quit smoking. Taking vitamin D once weekly with daily pill. Also rash to chin with itching and irritation.   Past Medical History:  has a past medical history of Alcohol dependence with intoxication, unspecified, Arthropathy, unspecified, Benign essential HTN, Chronic constipation, COPD (chronic obstructive pulmonary disease), Coronary arteriosclerosis, Erosive esophagitis, GERD (gastroesophageal reflux disease), H/O metabolic and nutritional disorder, Harmful pattern of use of nicotine, Hemiplegia, History of iron deficiency, History of traumatic brain injury, Hypokalemia, Major depressive disorder, single episode, unspecified, Mild chronic obstructive pulmonary disease, Mixed anxiety and depressive disorder, Mixed hyperlipidemia, Neuralgia, Nicotine dependence, Obstructive sleep apnea syndrome, Other seasonal allergic rhinitis, Seizure disorder, Tobacco user, Unspecified viral hepatitis C without hepatic coma, Vascular insufficiency, Vitamin B12 deficiency, and Vitamin D deficiency.    Social History:  reports that he has been smoking cigarettes. He has been exposed to tobacco smoke. He uses smokeless tobacco. He reports current alcohol use. He reports that he does not use drugs.    Care Team: Patient Care Team:  Vanessa Kincaid DNP as PCP - General (Family Medicine)  Su Allen FNP as Nurse Practitioner (Cardiology)     Current Medications:      Review of Systems   Constitutional: Negative.    HENT:  Positive for postnasal drip.    Eyes: Negative.    Respiratory:  Positive for cough. Negative for  "shortness of breath.    Cardiovascular:  Positive for leg swelling (right).   Gastrointestinal:  Negative for constipation (well with mediation).   Endocrine: Negative.    Genitourinary: Negative.    Musculoskeletal:  Positive for arthralgias, back pain and myalgias.   Integumentary:  Positive for rash.   Allergic/Immunologic: Positive for environmental allergies.   Neurological:  Positive for weakness (right sided).   Hematological: Negative.    Psychiatric/Behavioral:  Negative for decreased concentration, depressed mood and sleep disturbance.         Visit Vitals  /71 (BP Location: Left arm, Patient Position: Sitting)   Pulse 62   Temp 98.1 °F (36.7 °C)   Resp 20   Ht 5' 9" (1.753 m)   Wt 89.4 kg (197 lb)   SpO2 98%   BMI 29.09 kg/m²       Physical Exam  Vitals and nursing note reviewed.   Constitutional:       General: He is not in acute distress.     Appearance: He is not ill-appearing.   HENT:      Head: Normocephalic and atraumatic.      Mouth/Throat:      Mouth: Mucous membranes are moist.      Pharynx: Oropharynx is clear.   Eyes:      General: No scleral icterus.     Extraocular Movements: Extraocular movements intact.      Conjunctiva/sclera: Conjunctivae normal.      Pupils: Pupils are equal, round, and reactive to light.   Neck:      Vascular: No carotid bruit.   Cardiovascular:      Rate and Rhythm: Normal rate and regular rhythm.      Pulses: Normal pulses.      Heart sounds: Normal heart sounds. No murmur heard.     No friction rub. No gallop.   Pulmonary:      Effort: Pulmonary effort is normal. No respiratory distress.      Breath sounds: Normal breath sounds. No wheezing, rhonchi or rales.   Abdominal:      General: Abdomen is flat. Bowel sounds are normal. There is no distension.      Palpations: Abdomen is soft. There is no mass.      Tenderness: There is no abdominal tenderness.   Musculoskeletal:      Cervical back: Normal range of motion and neck supple.      Comments: Right sided " weakness with diminished ROM and dragging right foot with ambulation   Skin:     General: Skin is warm and dry.      Findings: Erythema and rash present.          Neurological:      General: No focal deficit present.      Mental Status: He is alert and oriented to person, place, and time.      Comments: Right sided weakness   Psychiatric:         Mood and Affect: Mood normal.          Labs Reviewed:  Chemistry:  Lab Results   Component Value Date     11/25/2024    K 3.4 (L) 11/25/2024    BUN 7 11/25/2024    CREATININE 0.86 11/25/2024    EGFRNORACEVR >90 11/25/2024    GLUCOSE 96 11/25/2024    CALCIUM 9.5 11/25/2024    ALKPHOS 130 11/25/2024    LABPROT 6.6 11/25/2024    ALBUMIN 4.3 11/25/2024    BILIDIR  05/06/2024      Comment:      Unable to calculate due to low unconjugated bili result    AST 15 (L) 11/25/2024    ALT 13 11/25/2024    PHOS 3.8 02/05/2024    [id number] 24 (L) 11/25/2024    TSH 1.920 10/30/2023        Lab Results   Component Value Date    HGBA1C 5.4 05/06/2024        Hematology:  Lab Results   Component Value Date    WBC 6.17 08/05/2024    RBC 3.96 (L) 08/05/2024    HGB 13.0 08/05/2024    HCT 36.5 08/05/2024    MCV 92.2 08/05/2024    MCH 32.8 08/05/2024    MCHC 35.6 08/05/2024    RDW 13.6 08/05/2024     08/05/2024    MPV 10.4 08/05/2024    BASO 0.05 09/04/2023       Lipid Panel:  Lab Results   Component Value Date    CHOL 82 11/25/2024    HDL 32 (L) 11/25/2024    LDLDIRECT 37.8 11/25/2024    TRIG 103 11/25/2024        Assessment & Plan:  1. Benign essential HTN  Assessment & Plan:  Lotrel 10/20 one daily, blood pressure well controlled. The current medical regimen is effective;  continue present plan and medications.     Orders:  -     amlodipine-benazepril 10-20mg (LOTREL) 10-20 mg per capsule; Take 1 capsule by mouth once daily.  Dispense: 90 capsule; Refill: 1    2. Pain  -     celecoxib (CELEBREX) 200 MG capsule; Take 1 tablet twice daily if needed for pain and arthritis but take it  with food and avoid any additional pain medicines such as ibuprofen.  Dispense: 90 capsule; Refill: 3    3. Seasonal allergies  -     cetirizine (ZYRTEC) 10 MG tablet; TAKE ONE(1) TAB BY MOUTH EVERY NIGHT AT BEDTIME FOR SINUS, ALLERGY, &/OR CONGESTION  Dispense: 90 tablet; Refill: 1    4. Mixed hyperlipidemia  Assessment & Plan:  Patient counseled on diet with low carb low-fat low triglyceride. Of great concern is 12 cokes daily discussed the potential complications that could develop he understands these risks but declines to make any changes at this time. He is taking vascepa 1 gm bid, crestor 10 mg qhs     Orders:  -     icosapent ethyL (VASCEPA) 1 gram Cap; See Instructions, TAKE TWO(2) CAPS BY MOUTH TWICE DAILY FOR CHOLESTEROL ** MUTLIPLE BOTTLES **, # 360 cap(s), 1 Refill(s), Pharmacy: [facility], 177.5, cm, Height/Length Dosing, 06/29/22 9:46:00 CDT, 102.05, kg, Weight Dosing, 06/29/22 9:4...  Dispense: 360 capsule; Refill: 1  -     rosuvastatin (CRESTOR) 10 MG tablet; Take 1 tablet (10 mg total) by mouth every evening.  Dispense: 90 tablet; Refill: 1    5. Vitamin D deficiency  -     cholecalciferol, vitamin D3, 1,250 mcg (50,000 unit) capsule; Take 1 capsule (50,000 Units total) by mouth twice a week. Dose increase  Dispense: 26 capsule; Refill: 1    6. Coronary artery disease, unspecified vessel or lesion type, unspecified whether angina present, unspecified whether native or transplanted heart  -     aspirin (ECOTRIN) 81 MG EC tablet; Take 1 tablet (81 mg total) by mouth once daily.  Dispense: 90 tablet; Refill: 1  -     clopidogreL (PLAVIX) 75 mg tablet; TAKE ONE(1) TAB BY MOUTH ONCE A DAY FOR BLOOD THINNER TO HELP PREVENT HEART ATTACK &/OR STROKE  Dispense: 90 tablet; Refill: 1    7. Exocrine pancreatic insufficiency  Overview:  Dr Bryant, exocrine pancreatic insufficiency; likely 2/2 chronic alcohol abuse, ?, gastroparesis started creon 36K3 cap with food intake    Assessment & Plan:  Creon  36K 2 cap at noon with food and tolerating this without GI upset.  No GI cramping without constipation or diarrhea.     Orders:  -     CREON 36,000-114,000- 180,000 unit CpDR; Take 2 capsules by mouth Daily.  Dispense: 180 capsule; Refill: 1    8. Depression, unspecified depression type  -     EScitalopram oxalate (LEXAPRO) 20 MG tablet; TAKE ONE(1) TAB BY MOUTH EVERY NIGHT AT BEDTIME FOR DEPRESSION, ANXIETY, &/OR IRRITABILITY  Dispense: 90 tablet; Refill: 1    9. Constipation, unspecified constipation type  -     linaCLOtide (LINZESS) 145 mcg Cap capsule; Take 1 capsule (145 mcg total) by mouth before breakfast.  Dispense: 30 capsule; Refill: 2    10. Other generalized epilepsy, not intractable, without status epilepticus  -     levETIRAcetam (KEPPRA) 500 MG Tab; TAKE ONE&ONE HALF (1&1/2) TABS BY MOUTH TWICE DAILY WITH FOOD FOR SEIZURES  MULTIPLE BOTTLES  Dispense: 270 tablet; Refill: 0    11. Bowers's esophagus without dysplasia  Overview:  Dr Bryant visit continue pantoprazole 40 mg daily. F/u 1 year if needed sooner    Orders:  -     pantoprazole (PROTONIX) 40 MG tablet; Take 1 tablet (40 mg total) by mouth once daily.  Dispense: 30 tablet; Refill: 2    12. Hypokalemia  Assessment & Plan:  Patient taking klor-con 10 mEq bid. Increase potassium content in foods and recheck 3 month.     Orders:  -     potassium chloride (KLOR-CON) 10 MEQ TbSR; Take 1 tablet (10 mEq total) by mouth 2 (two) times daily.  Dispense: 90 tablet; Refill: 1    13. Seizure disorder  Assessment & Plan:      No seizure since taking Keppra and living with father. No side effects with medications.         14. Major depressive disorder with single episode, in full remission  Assessment & Plan:  Lexapro 20 mg daily . The current medical regimen is effective;  continue present plan and medications.          15. Seborrheic dermatitis  Assessment & Plan:  Nizoral daily for 2 weeks then 3 x weekly for rash to chin.       Orders:  -      ketoconazole (NIZORAL) 2 % cream; Apply topically once daily. Apply once a day to rash 2 weeks then decrease to 3 times a week as needed for rash  Dispense: 60 g; Refill: 2    16. Mild chronic obstructive pulmonary disease  Assessment & Plan:  Anoro 1 ekaterina berry. The current medical regimen is effective;  continue present plan and medications.        17. Chronic constipation  Assessment & Plan:  Linzess 145 mg daily working well. The current medical regimen is effective;  continue present plan and medications.       18. Hyperuricemia  Assessment & Plan:  allopurinol 300 mg daily denies any signs gout or arthritis or kidney stones at this time negative for blood in urine or pain with urination. We will check lab and notify when available continue medication until that time     Orders:  -     allopurinoL (ZYLOPRIM) 300 MG tablet; Take 1 tablet (300 mg total) by mouth once daily.  Dispense: 90 tablet; Refill: 1    19. Aortic atherosclerosis    20. Hemiplegia, unspecified etiology, unspecified hemiplegia type, unspecified laterality  Assessment & Plan:  Hemiplegia continues to right side but has not had any falls able to live at home with his father walking in the yd without fall.  Continue current activity and notify any problems.           Future Appointments   Date Time Provider Department Center   2/26/2025  9:20 AM Vanessa Kincaid DNP Located within [facility]   5/26/2025  7:45 AM [facility]UR[facility], [facility]       Follow up in about 3 months (around 2/27/2025). Call sooner if needed.    Vanessa Kincaid DNP

## 2024-11-27 NOTE — ASSESSMENT & PLAN NOTE
Hemiplegia continues to right side but has not had any falls able to live at home with his father walking in the yd without fall.  Continue current activity and notify any problems.

## 2024-11-27 NOTE — ASSESSMENT & PLAN NOTE
Creon 36K 2 cap at noon with food and tolerating this without GI upset.  No GI cramping without constipation or diarrhea.

## 2024-11-27 NOTE — ASSESSMENT & PLAN NOTE
Anoro 1 ekaterina berry. The current medical regimen is effective;  continue present plan and medications.

## 2024-11-27 NOTE — ASSESSMENT & PLAN NOTE
Lotrel 10/20 one daily, blood pressure well controlled. The current medical regimen is effective;  continue present plan and medications.

## 2024-12-10 ENCOUNTER — TELEPHONE (OUTPATIENT)
Dept: FAMILY MEDICINE | Facility: CLINIC | Age: 59
End: 2024-12-10
Payer: MEDICARE

## 2024-12-10 NOTE — TELEPHONE ENCOUNTER
----- Message from Lesvia sent at 12/10/2024 10:49 AM CST -----  Patient's Father, Jose Ramon Saenz called wanting to know if Patient needs to continue taking aspirin 81 mg?  Please call Mr. Albright at [phone]

## 2024-12-10 NOTE — TELEPHONE ENCOUNTER
Pts dad was called and was told that he needed to call his cardiologist Dr. Franklin and ask about aspirin.

## 2024-12-23 DIAGNOSIS — E53.8 VITAMIN B 12 DEFICIENCY: Primary | ICD-10-CM

## 2024-12-23 RX ORDER — SYRINGE WITH NEEDLE, 1 ML 25GX5/8"
SYRINGE, EMPTY DISPOSABLE MISCELLANEOUS
Qty: 6 EACH | Refills: 2 | Status: SHIPPED | OUTPATIENT
Start: 2024-12-23

## 2025-01-27 ENCOUNTER — TELEPHONE (OUTPATIENT)
Dept: FAMILY MEDICINE | Facility: CLINIC | Age: 60
End: 2025-01-27
Payer: MEDICARE

## 2025-01-27 DIAGNOSIS — E78.2 MIXED HYPERLIPIDEMIA: ICD-10-CM

## 2025-01-27 DIAGNOSIS — K22.70 BARRETT'S ESOPHAGUS WITHOUT DYSPLASIA: ICD-10-CM

## 2025-01-27 RX ORDER — PANTOPRAZOLE SODIUM 40 MG/1
40 TABLET, DELAYED RELEASE ORAL DAILY
Qty: 30 TABLET | Refills: 2 | Status: SHIPPED | OUTPATIENT
Start: 2025-01-27

## 2025-01-27 RX ORDER — ROSUVASTATIN CALCIUM 10 MG/1
10 TABLET, COATED ORAL NIGHTLY
Qty: 90 TABLET | Refills: 1 | Status: SHIPPED | OUTPATIENT
Start: 2025-01-27

## 2025-01-27 NOTE — TELEPHONE ENCOUNTER
----- Message from Pebbles sent at 1/27/2025  9:27 AM CST -----  Regarding: Med refill  Needs Rosuvastatin 40mg (chart says 10mg)  [facility]

## 2025-02-17 ENCOUNTER — TELEPHONE (OUTPATIENT)
Dept: FAMILY MEDICINE | Facility: CLINIC | Age: 60
End: 2025-02-17
Payer: MEDICARE

## 2025-02-17 DIAGNOSIS — J30.2 SEASONAL ALLERGIES: ICD-10-CM

## 2025-02-17 DIAGNOSIS — M79.2 NERVE PAIN: ICD-10-CM

## 2025-02-17 RX ORDER — CETIRIZINE HYDROCHLORIDE 10 MG/1
TABLET ORAL
Qty: 90 TABLET | Refills: 1 | Status: SHIPPED | OUTPATIENT
Start: 2025-02-17

## 2025-02-17 RX ORDER — GABAPENTIN 400 MG/1
CAPSULE ORAL
Qty: 90 CAPSULE | Refills: 1 | Status: SHIPPED | OUTPATIENT
Start: 2025-02-17

## 2025-02-17 NOTE — TELEPHONE ENCOUNTER
----- Message from Lesiva sent at 2/17/2025  8:01 AM CST -----  Patient needs refill on Gabapentin 400 mg and Cetrizine 10 mg called in to [facility]

## 2025-02-24 ENCOUNTER — TELEPHONE (OUTPATIENT)
Dept: FAMILY MEDICINE | Facility: CLINIC | Age: 60
End: 2025-02-24
Payer: MEDICARE

## 2025-02-24 DIAGNOSIS — K59.00 CONSTIPATION, UNSPECIFIED CONSTIPATION TYPE: ICD-10-CM

## 2025-02-24 DIAGNOSIS — J30.2 SEASONAL ALLERGIES: ICD-10-CM

## 2025-02-24 DIAGNOSIS — K22.70 BARRETT'S ESOPHAGUS WITHOUT DYSPLASIA: ICD-10-CM

## 2025-02-24 RX ORDER — CETIRIZINE HYDROCHLORIDE 10 MG/1
TABLET ORAL
Qty: 90 TABLET | Refills: 1 | Status: SHIPPED | OUTPATIENT
Start: 2025-02-24 | End: 2025-02-26 | Stop reason: SDUPTHER

## 2025-02-24 RX ORDER — PANTOPRAZOLE SODIUM 40 MG/1
40 TABLET, DELAYED RELEASE ORAL DAILY
Qty: 30 TABLET | Refills: 2 | Status: SHIPPED | OUTPATIENT
Start: 2025-02-24 | End: 2025-02-26 | Stop reason: SDUPTHER

## 2025-02-24 NOTE — TELEPHONE ENCOUNTER
----- Message from Lesvia sent at 2/24/2025 11:04 AM CST -----  Patient needs refill on Cetirizine 10 mg, generic Protonix 40 mg and Linzess 145 mg called in to [facility]

## 2025-02-26 ENCOUNTER — RESULTS FOLLOW-UP (OUTPATIENT)
Dept: FAMILY MEDICINE | Facility: CLINIC | Age: 60
End: 2025-02-26

## 2025-02-26 ENCOUNTER — OFFICE VISIT (OUTPATIENT)
Dept: FAMILY MEDICINE | Facility: CLINIC | Age: 60
End: 2025-02-26
Payer: MEDICARE

## 2025-02-26 ENCOUNTER — LAB VISIT (OUTPATIENT)
Dept: LAB | Facility: HOSPITAL | Age: 60
End: 2025-02-26
Attending: NURSE PRACTITIONER
Payer: MEDICARE

## 2025-02-26 VITALS
OXYGEN SATURATION: 97 % | HEIGHT: 69 IN | WEIGHT: 194 LBS | BODY MASS INDEX: 28.73 KG/M2 | RESPIRATION RATE: 20 BRPM | DIASTOLIC BLOOD PRESSURE: 68 MMHG | SYSTOLIC BLOOD PRESSURE: 129 MMHG | HEART RATE: 77 BPM | TEMPERATURE: 98 F

## 2025-02-26 DIAGNOSIS — E55.9 VITAMIN D DEFICIENCY: ICD-10-CM

## 2025-02-26 DIAGNOSIS — E87.6 HYPOKALEMIA: ICD-10-CM

## 2025-02-26 DIAGNOSIS — Z12.5 PROSTATE CANCER SCREENING: ICD-10-CM

## 2025-02-26 DIAGNOSIS — J30.9 ALLERGIC RHINITIS, UNSPECIFIED SEASONALITY, UNSPECIFIED TRIGGER: ICD-10-CM

## 2025-02-26 DIAGNOSIS — E79.0 HYPERURICEMIA: ICD-10-CM

## 2025-02-26 DIAGNOSIS — Z12.5 SCREENING FOR PROSTATE CANCER: ICD-10-CM

## 2025-02-26 DIAGNOSIS — F32.A DEPRESSION, UNSPECIFIED DEPRESSION TYPE: ICD-10-CM

## 2025-02-26 DIAGNOSIS — R73.01 IMPAIRED FASTING BLOOD SUGAR: ICD-10-CM

## 2025-02-26 DIAGNOSIS — G40.909 SEIZURE DISORDER: Primary | ICD-10-CM

## 2025-02-26 DIAGNOSIS — J31.0 CHRONIC RHINITIS: ICD-10-CM

## 2025-02-26 DIAGNOSIS — Z79.899 LONG TERM USE OF DRUG: ICD-10-CM

## 2025-02-26 DIAGNOSIS — I70.0 AORTIC ATHEROSCLEROSIS: ICD-10-CM

## 2025-02-26 DIAGNOSIS — Z71.89 ACP (ADVANCE CARE PLANNING): ICD-10-CM

## 2025-02-26 DIAGNOSIS — K86.81 EXOCRINE PANCREATIC INSUFFICIENCY: ICD-10-CM

## 2025-02-26 DIAGNOSIS — J30.2 SEASONAL ALLERGIES: ICD-10-CM

## 2025-02-26 DIAGNOSIS — G81.90 HEMIPLEGIA, UNSPECIFIED ETIOLOGY, UNSPECIFIED HEMIPLEGIA TYPE, UNSPECIFIED LATERALITY: ICD-10-CM

## 2025-02-26 DIAGNOSIS — K22.70 BARRETT'S ESOPHAGUS WITHOUT DYSPLASIA: ICD-10-CM

## 2025-02-26 DIAGNOSIS — M79.2 NERVE PAIN: ICD-10-CM

## 2025-02-26 DIAGNOSIS — E79.0 HYPERURICEMIA: Primary | ICD-10-CM

## 2025-02-26 DIAGNOSIS — K59.00 CONSTIPATION, UNSPECIFIED CONSTIPATION TYPE: ICD-10-CM

## 2025-02-26 DIAGNOSIS — J44.9 MILD CHRONIC OBSTRUCTIVE PULMONARY DISEASE: ICD-10-CM

## 2025-02-26 DIAGNOSIS — E78.2 MIXED HYPERLIPIDEMIA: ICD-10-CM

## 2025-02-26 LAB
ALBUMIN SERPL-MCNC: 4.1 G/DL (ref 3.4–5)
ALBUMIN/GLOB SERPL: 1.5 RATIO
ALP SERPL-CCNC: 88 UNIT/L (ref 50–144)
ALT SERPL-CCNC: 13 UNIT/L (ref 1–45)
ANION GAP SERPL CALC-SCNC: 13 MEQ/L (ref 2–13)
AST SERPL-CCNC: 18 UNIT/L (ref 17–59)
BASOPHILS # BLD AUTO: 0.02 X10(3)/MCL (ref 0.01–0.08)
BASOPHILS NFR BLD AUTO: 0.3 % (ref 0.1–1.2)
BILIRUB SERPL-MCNC: 0.5 MG/DL (ref 0–1)
BUN SERPL-MCNC: 5 MG/DL (ref 7–20)
CALCIUM SERPL-MCNC: 9.1 MG/DL (ref 8.4–10.2)
CHLORIDE SERPL-SCNC: 110 MMOL/L (ref 98–110)
CO2 SERPL-SCNC: 18 MMOL/L (ref 21–32)
CREAT SERPL-MCNC: 0.73 MG/DL (ref 0.66–1.25)
CREAT/UREA NIT SERPL: 7 (ref 12–20)
EOSINOPHIL # BLD AUTO: 0.43 X10(3)/MCL (ref 0.04–0.54)
EOSINOPHIL NFR BLD AUTO: 6.7 % (ref 0.7–7)
ERYTHROCYTE [DISTWIDTH] IN BLOOD BY AUTOMATED COUNT: 13.4 %
EST. AVERAGE GLUCOSE BLD GHB EST-MCNC: 114 MG/DL (ref 70–115)
GFR SERPLBLD CREATININE-BSD FMLA CKD-EPI: >90 ML/MIN/1.73/M2
GLOBULIN SER-MCNC: 2.7 GM/DL (ref 2–3.9)
GLUCOSE SERPL-MCNC: 115 MG/DL (ref 70–115)
HBA1C MFR BLD: 5.6 % (ref 4–6)
HCT VFR BLD AUTO: 39 % (ref 36–52)
HGB BLD-MCNC: 13.3 G/DL (ref 13–18)
IMM GRANULOCYTES # BLD AUTO: 0.04 X10(3)/MCL (ref 0–0.03)
IMM GRANULOCYTES NFR BLD AUTO: 0.6 % (ref 0–0.5)
LYMPHOCYTES # BLD AUTO: 1.9 X10(3)/MCL (ref 1.32–3.57)
LYMPHOCYTES NFR BLD AUTO: 29.6 % (ref 20–55)
MCH RBC QN AUTO: 31.7 PG (ref 27–34)
MCHC RBC AUTO-ENTMCNC: 34.1 G/DL (ref 31–37)
MCV RBC AUTO: 93.1 FL (ref 79–99)
MONOCYTES # BLD AUTO: 0.47 X10(3)/MCL (ref 0.3–0.82)
MONOCYTES NFR BLD AUTO: 7.3 % (ref 4.7–12.5)
NEUTROPHILS # BLD AUTO: 3.56 X10(3)/MCL (ref 1.78–5.38)
NEUTROPHILS NFR BLD AUTO: 55.5 % (ref 37–73)
PLATELET # BLD AUTO: 230 X10(3)/MCL (ref 140–371)
PLATELET # BLD EST: ADEQUATE 10*3/UL
PMV BLD AUTO: 9.5 FL (ref 9.4–12.4)
POTASSIUM SERPL-SCNC: 3.4 MMOL/L (ref 3.5–5.1)
PROT SERPL-MCNC: 6.8 GM/DL (ref 6.3–8.2)
PSA SERPL-MCNC: 0.4 NG/ML
RBC # BLD AUTO: 4.19 X10(6)/MCL (ref 4–6)
SODIUM SERPL-SCNC: 141 MMOL/L (ref 136–145)
URATE SERPL-MCNC: 6 MG/DL (ref 2.6–7.2)
WBC # BLD AUTO: 6.42 X10(3)/MCL (ref 4–11.5)

## 2025-02-26 PROCEDURE — 84153 ASSAY OF PSA TOTAL: CPT

## 2025-02-26 PROCEDURE — 85025 COMPLETE CBC W/AUTO DIFF WBC: CPT

## 2025-02-26 PROCEDURE — 99214 OFFICE O/P EST MOD 30 MIN: CPT | Mod: ,,, | Performed by: NURSE PRACTITIONER

## 2025-02-26 PROCEDURE — 84550 ASSAY OF BLOOD/URIC ACID: CPT

## 2025-02-26 PROCEDURE — 36415 COLL VENOUS BLD VENIPUNCTURE: CPT

## 2025-02-26 PROCEDURE — 83036 HEMOGLOBIN GLYCOSYLATED A1C: CPT

## 2025-02-26 PROCEDURE — 80053 COMPREHEN METABOLIC PANEL: CPT

## 2025-02-26 RX ORDER — LOSARTAN POTASSIUM 50 MG/1
50 TABLET ORAL DAILY
COMMUNITY
Start: 2025-01-07

## 2025-02-26 RX ORDER — FLUTICASONE PROPIONATE 50 MCG
1 SPRAY, SUSPENSION (ML) NASAL DAILY
Qty: 48 G | Refills: 1 | Status: SHIPPED | OUTPATIENT
Start: 2025-02-26

## 2025-02-26 RX ORDER — PANCRELIPASE 36000; 180000; 114000 [USP'U]/1; [USP'U]/1; [USP'U]/1
2 CAPSULE, DELAYED RELEASE PELLETS ORAL DAILY
Qty: 180 CAPSULE | Refills: 1 | Status: SHIPPED | OUTPATIENT
Start: 2025-02-26

## 2025-02-26 RX ORDER — CETIRIZINE HYDROCHLORIDE 10 MG/1
TABLET ORAL
Qty: 90 TABLET | Refills: 1 | Status: SHIPPED | OUTPATIENT
Start: 2025-02-26

## 2025-02-26 RX ORDER — HYDROCODONE BITARTRATE AND ACETAMINOPHEN 10; 325 MG/1; MG/1
1 TABLET ORAL 4 TIMES DAILY PRN
COMMUNITY
Start: 2024-10-22 | End: 2025-02-26

## 2025-02-26 RX ORDER — CELECOXIB 200 MG/1
CAPSULE ORAL
Qty: 90 CAPSULE | Refills: 3 | Status: SHIPPED | OUTPATIENT
Start: 2025-02-26

## 2025-02-26 RX ORDER — UMECLIDINIUM BROMIDE AND VILANTEROL TRIFENATATE 62.5; 25 UG/1; UG/1
1 POWDER RESPIRATORY (INHALATION) DAILY
Qty: 180 EACH | Refills: 2 | Status: SHIPPED | OUTPATIENT
Start: 2025-02-26

## 2025-02-26 RX ORDER — ROSUVASTATIN CALCIUM 10 MG/1
10 TABLET, COATED ORAL NIGHTLY
Qty: 90 TABLET | Refills: 1 | Status: SHIPPED | OUTPATIENT
Start: 2025-02-26

## 2025-02-26 RX ORDER — POTASSIUM CHLORIDE 750 MG/1
10 TABLET, EXTENDED RELEASE ORAL 2 TIMES DAILY
Qty: 90 TABLET | Refills: 1 | Status: SHIPPED | OUTPATIENT
Start: 2025-02-26

## 2025-02-26 RX ORDER — CLOPIDOGREL BISULFATE 75 MG/1
TABLET ORAL
Qty: 90 TABLET | Refills: 1 | Status: SHIPPED | OUTPATIENT
Start: 2025-02-26

## 2025-02-26 RX ORDER — ASPIRIN 325 MG
50000 TABLET, DELAYED RELEASE (ENTERIC COATED) ORAL
Qty: 26 CAPSULE | Refills: 1 | Status: SHIPPED | OUTPATIENT
Start: 2025-02-27

## 2025-02-26 RX ORDER — PANTOPRAZOLE SODIUM 40 MG/1
40 TABLET, DELAYED RELEASE ORAL DAILY
Qty: 30 TABLET | Refills: 2 | Status: SHIPPED | OUTPATIENT
Start: 2025-02-26

## 2025-02-26 RX ORDER — GABAPENTIN 400 MG/1
CAPSULE ORAL
Qty: 90 CAPSULE | Refills: 1 | Status: SHIPPED | OUTPATIENT
Start: 2025-02-26

## 2025-02-26 RX ORDER — ESCITALOPRAM OXALATE 20 MG/1
TABLET ORAL
Qty: 90 TABLET | Refills: 1 | Status: SHIPPED | OUTPATIENT
Start: 2025-02-26

## 2025-02-26 NOTE — ASSESSMENT & PLAN NOTE
No seizure since taking Keppra 500 mg and no seizure since living with father. No side effects with medications. Will notify with results of lab draw when available. Continue current treatment until results available.

## 2025-02-26 NOTE — ASSESSMENT & PLAN NOTE
Taking vitamin D 50,000 weekly but not therapeutic with seizure medications, increased additional daily. Unable to afford recheck of vitamin D and unable to afford. The current medical regimen is effective;  continue present plan and medications.

## 2025-02-26 NOTE — PROGRESS NOTES
Patient ID: Phan Saenz  : 1965     Chief Complaint: No chief complaint on file.    Allergies: Patient has no known allergies.     History of Present Illness:  The patient is a 59 y.o. White male who presents to clinic for evaluation and management with a chief complaint of No chief complaint on file.   History of Present Illness    CHIEF COMPLAINT:  Patient presents today for follow up    CURRENT SYMPTOMS:  He reports mild sinus drainage. He denies heartburn, gout attacks, kidney stones, blood in urine, nausea, vomiting, or recent seizures. He requires daily Celebrex for arthritis management.    MEDICATIONS:  He takes Linzess in the morning for constipation, Zyrtec with nasal spray for allergies, Gabapentin for sleep and nerve pain, Plavix for stroke prevention, and Creon for digestion. He denies running out of any medications.    SOCIAL HISTORY:  He has a history of smoking half to one pack of cigarettes daily. He reports walking regularly and performs light activities such as picking up items, though feels limited in distance. His fluid intake consists of 6-7 L of soda daily, reduced from 12 L, and expresses aversion to water.      ROS:  General: -fever, -chills, -fatigue, -weight gain, -weight loss  Eyes: -vision changes, -redness, -discharge  ENT: -ear pain, +nasal congestion, -sore throat  Cardiovascular: -chest pain, -palpitations, -lower extremity edema  Respiratory: -cough, -shortness of breath  Gastrointestinal: -abdominal pain, -nausea, -vomiting, -diarrhea, -constipation, -blood in stool, -heartburn  Genitourinary: -dysuria, -hematuria, -frequency  Musculoskeletal: -joint pain, -muscle pain  Skin: -rash, -lesion  Neurological: -headache, -dizziness, -numbness, -tingling  Psychiatric: -anxiety, -depression, -sleep difficulty          Past Medical History:  has a past medical history of Alcohol dependence with intoxication, unspecified, Arthropathy, unspecified, Benign essential HTN,  "Chronic constipation, COPD (chronic obstructive pulmonary disease), Coronary arteriosclerosis, Erosive esophagitis, GERD (gastroesophageal reflux disease), H/O metabolic and nutritional disorder, Harmful pattern of use of nicotine, Hemiplegia, History of iron deficiency, History of traumatic brain injury, Hypokalemia, Major depressive disorder, single episode, unspecified, Mild chronic obstructive pulmonary disease, Mixed anxiety and depressive disorder, Mixed hyperlipidemia, Neuralgia, Nicotine dependence, Obstructive sleep apnea syndrome, Other seasonal allergic rhinitis, Seizure disorder, Tobacco user, Unspecified viral hepatitis C without hepatic coma, Vascular insufficiency, Vitamin B12 deficiency, and Vitamin D deficiency.    Social History:  reports that he has been smoking cigarettes. He has been exposed to tobacco smoke. He uses smokeless tobacco. He reports current alcohol use. He reports that he does not use drugs.    Care Team: Patient Care Team:  Vanessa Kincaid DNP as PCP - General (Family Medicine)  Su Allen FNP as Nurse Practitioner (Cardiology)     Current Medications:  Current Outpatient Medications   Medication Instructions    allopurinoL (ZYLOPRIM) 300 mg, Oral, Daily    amlodipine-benazepril 10-20mg (LOTREL) 10-20 mg per capsule 1 capsule, Oral, Daily    aspirin (ECOTRIN) 81 mg, Oral, Daily    azelastine (ASTELIN) 137 mcg, Nasal, 2 times daily    BD LUER-KUNAL SYRINGE 3 mL 25 x 5/8" Syrg USE WITH B-12    celecoxib (CELEBREX) 200 MG capsule Take 1 tablet twice daily if needed for pain and arthritis but take it with food and avoid any additional pain medicines such as ibuprofen.    cetirizine (ZYRTEC) 10 MG tablet TAKE ONE(1) TAB BY MOUTH EVERY NIGHT AT BEDTIME FOR SINUS, ALLERGY, &/OR CONGESTION    [START ON 2/27/2025] cholecalciferol (vitamin D3) 50,000 Units, Oral, Twice weekly, Dose increase    clopidogreL (PLAVIX) 75 mg tablet TAKE ONE(1) TAB BY MOUTH ONCE A DAY FOR BLOOD THINNER TO HELP " PREVENT HEART ATTACK &/OR STROKE    colchicine (COLCRYS) 0.6 mg, 2 times daily PRN    CREON 36,000-114,000- 180,000 unit CpDR 2 capsules, Oral, Daily    cyanocobalamin/folic acid (VITAMIN B12-FOLIC ACID) 1,000-400 mcg Lozg 1 mcg, Oral, Daily    EScitalopram oxalate (LEXAPRO) 20 MG tablet TAKE ONE(1) TAB BY MOUTH EVERY NIGHT AT BEDTIME FOR DEPRESSION, ANXIETY, &/OR IRRITABILITY    fluticasone propionate (FLONASE) 50 mcg, Each Nostril, Daily    gabapentin (NEURONTIN) 400 MG capsule TAKE ONE(1) CAP BY MOUTH 3 TIMES DAILY WITH FOOD AS NEEDED FOR NERVE PAIN    HYDROcodone-acetaminophen (NORCO) 5-325 mg per tablet 1 tablet, Every 4 hours    icosapent ethyL (VASCEPA) 1 gram Cap See Instructions, TAKE TWO(2) CAPS BY MOUTH TWICE DAILY FOR CHOLESTEROL ** MUTLIPLE BOTTLES **, # 360 cap(s), 1 Refill(s), Pharmacy: [facility], 177.5, cm, Height/Length Dosing, 06/29/22 9:46:00 CDT, 102.05, kg, Weight Dosing, 06/29/22 9:4...    ketoconazole (NIZORAL) 2 % cream Topical (Top), Daily, Apply once a day to rash 2 weeks then decrease to 3 times a week as needed for rash    levETIRAcetam (KEPPRA) 500 MG Tab TAKE ONE&ONE HALF (1&1/2) TABS BY MOUTH TWICE DAILY WITH FOOD FOR SEIZURES  MULTIPLE BOTTLES    linaCLOtide (LINZESS) 145 mcg, Oral, Before breakfast    losartan (COZAAR) 50 mg, Daily    pantoprazole (PROTONIX) 40 mg, Oral, Daily    potassium chloride (KLOR-CON) 10 MEQ TbSR 10 mEq, Oral, 2 times daily    rosuvastatin (CRESTOR) 10 mg, Oral, Nightly    umeclidinium-vilanteroL (ANORO ELLIPTA) 62.5-25 mcg/actuation DsDv 1 puff, Inhalation, Daily, Controller       Review of Systems   Constitutional:  Positive for activity change (exercising more).   HENT:  Positive for rhinorrhea.    Eyes: Negative.    Respiratory:  Negative for cough and shortness of breath.    Cardiovascular: Negative.    Gastrointestinal: Negative.    Endocrine: Negative.    Musculoskeletal:  Positive for arthralgias and back pain.   Allergic/Immunologic:  "Positive for environmental allergies.   Hematological: Negative.    Psychiatric/Behavioral: Negative.  Negative for dysphoric mood and sleep disturbance. The patient is not nervous/anxious.         Visit Vitals  /68 (BP Location: Left arm, Patient Position: Sitting)   Pulse 77   Temp 97.7 °F (36.5 °C)   Resp 20   Ht 5' 9" (1.753 m)   Wt 88 kg (194 lb)   SpO2 97%   BMI 28.65 kg/m²       Physical Exam  Vitals and nursing note reviewed.   Constitutional:       General: He is not in acute distress.     Appearance: He is not ill-appearing.   HENT:      Head: Normocephalic and atraumatic.      Nose: Rhinorrhea present.      Mouth/Throat:      Mouth: Mucous membranes are moist.      Pharynx: Oropharynx is clear.   Eyes:      General: No scleral icterus.     Extraocular Movements: Extraocular movements intact.      Conjunctiva/sclera: Conjunctivae normal.      Pupils: Pupils are equal, round, and reactive to light.   Neck:      Vascular: No carotid bruit.   Cardiovascular:      Rate and Rhythm: Normal rate and regular rhythm.      Pulses: Normal pulses.      Heart sounds: Normal heart sounds. No murmur heard.     No friction rub. No gallop.   Pulmonary:      Effort: Pulmonary effort is normal. No respiratory distress.      Breath sounds: Normal breath sounds. No wheezing, rhonchi or rales.   Abdominal:      General: Abdomen is flat. Bowel sounds are normal. There is no distension.      Palpations: Abdomen is soft. There is no mass.      Tenderness: There is no abdominal tenderness.   Musculoskeletal:         General: Normal range of motion.      Cervical back: Normal range of motion and neck supple.   Skin:     General: Skin is warm and dry.   Neurological:      General: No focal deficit present.      Mental Status: He is alert and oriented to person, place, and time.   Psychiatric:         Mood and Affect: Mood normal.         Behavior: Behavior normal.          Labs Reviewed:  Chemistry:  Lab Results   Component " Value Date     02/26/2025    K 3.4 (L) 02/26/2025    BUN 5 (L) 02/26/2025    CREATININE 0.73 02/26/2025    EGFRNORACEVR >90 02/26/2025    GLUCOSE 115 02/26/2025    CALCIUM 9.1 02/26/2025    ALKPHOS 88 02/26/2025    LABPROT 6.8 02/26/2025    ALBUMIN 4.1 02/26/2025    BILIDIR  05/06/2024      Comment:      Unable to calculate due to low unconjugated bili result    AST 18 02/26/2025    ALT 13 02/26/2025    PHOS 3.8 02/05/2024    [id number] 24 (L) 11/25/2024    TSH 1.920 10/30/2023    PSA 0.40 02/26/2025        Lab Results   Component Value Date    HGBA1C 5.6 02/26/2025        Hematology:  Lab Results   Component Value Date    WBC 6.42 02/26/2025    RBC 4.19 02/26/2025    HGB 13.3 02/26/2025    HCT 39.0 02/26/2025    MCV 93.1 02/26/2025    MCH 31.7 02/26/2025    MCHC 34.1 02/26/2025    RDW 13.4 02/26/2025     02/26/2025    MPV 9.5 02/26/2025    BASO 0.05 09/04/2023       Lipid Panel:  Lab Results   Component Value Date    CHOL 82 11/25/2024    HDL 32 (L) 11/25/2024    LDLDIRECT 37.8 11/25/2024    TRIG 103 11/25/2024        Assessment & Plan:  1. Hemiplegia, unspecified etiology, unspecified hemiplegia type, unspecified laterality  Assessment & Plan:  Hemiplegia continues to right side but has not had any falls able to live at home with his father walking in the yd without fall.  Continue current activity and notify any problems.       2. Seizure disorder  Assessment & Plan:  No seizure since taking Keppra 500 mg and no seizure since living with father. No side effects with medications. Will notify with results of lab draw when available. Continue current treatment until results available.         3. Mixed hyperlipidemia  -     rosuvastatin (CRESTOR) 10 MG tablet; Take 1 tablet (10 mg total) by mouth every evening.  Dispense: 90 tablet; Refill: 1    4. Vitamin D deficiency  Assessment & Plan:  Taking vitamin D 50,000 weekly but not therapeutic with seizure medications, increased additional daily. Unable to  afford recheck of vitamin D and unable to afford. The current medical regimen is effective;  continue present plan and medications.      Orders:  -     cholecalciferol, vitamin D3, 1,250 mcg (50,000 unit) capsule; Take 1 capsule (50,000 Units total) by mouth twice a week. Dose increase  Dispense: 26 capsule; Refill: 1    5. Prostate cancer screening  Assessment & Plan:  Father history prostate cancer and desires to check psa. Declines TATI.     6. Constipation, unspecified constipation type  -     linaCLOtide (LINZESS) 145 mcg Cap capsule; Take 1 capsule (145 mcg total) by mouth before breakfast.  Dispense: 90 capsule; Refill: 1    7. Chronic rhinitis  Assessment & Plan:  Zyrtec 10 mg daily with Astelin nasal spray 1 spray prn bid. Nasal rinse prior to astelin.       8. Mild chronic obstructive pulmonary disease  Assessment & Plan:  Anoro 1 puff daiy. The current medical regimen is effective;  continue present plan and medications.     Orders:  -     umeclidinium-vilanteroL (ANORO ELLIPTA) 62.5-25 mcg/actuation DsDv; Inhale 1 puff into the lungs Daily. Controller  Dispense: 180 each; Refill: 2    9. Hypokalemia  -     potassium chloride (KLOR-CON) 10 MEQ TbSR; Take 1 tablet (10 mEq total) by mouth 2 (two) times daily.  Dispense: 90 tablet; Refill: 1    10. Bowers's esophagus without dysplasia  Overview:  Dr Bryant visit continue pantoprazole 40 mg daily. F/u 1 year if needed sooner    Orders:  -     pantoprazole (PROTONIX) 40 MG tablet; Take 1 tablet (40 mg total) by mouth once daily.  Dispense: 30 tablet; Refill: 2    11. Depression, unspecified depression type  -     EScitalopram oxalate (LEXAPRO) 20 MG tablet; TAKE ONE(1) TAB BY MOUTH EVERY NIGHT AT BEDTIME FOR DEPRESSION, ANXIETY, &/OR IRRITABILITY  Dispense: 90 tablet; Refill: 1    12. Allergic rhinitis, unspecified seasonality, unspecified trigger  -     fluticasone propionate (FLONASE) 50 mcg/actuation nasal spray; 1 spray (50 mcg total) by Each Nostril  route once daily.  Dispense: 48 g; Refill: 1    13. Coronary artery disease, unspecified vessel or lesion type, unspecified whether angina present, unspecified whether native or transplanted heart  -     clopidogreL (PLAVIX) 75 mg tablet; TAKE ONE(1) TAB BY MOUTH ONCE A DAY FOR BLOOD THINNER TO HELP PREVENT HEART ATTACK &/OR STROKE  Dispense: 90 tablet; Refill: 1    14. Exocrine pancreatic insufficiency  Overview:  Dr Bryant, exocrine pancreatic insufficiency; likely 2/2 chronic alcohol abuse, ?, gastroparesis started creon 36K3 cap with food intake    Orders:  -     CREON 36,000-114,000- 180,000 unit CpDR; Take 2 capsules by mouth Daily.  Dispense: 180 capsule; Refill: 1    15. Pain  -     celecoxib (CELEBREX) 200 MG capsule; Take 1 tablet twice daily if needed for pain and arthritis but take it with food and avoid any additional pain medicines such as ibuprofen.  Dispense: 90 capsule; Refill: 3    16. Seasonal allergies  -     cetirizine (ZYRTEC) 10 MG tablet; TAKE ONE(1) TAB BY MOUTH EVERY NIGHT AT BEDTIME FOR SINUS, ALLERGY, &/OR CONGESTION  Dispense: 90 tablet; Refill: 1    17. Nerve pain  -     gabapentin (NEURONTIN) 400 MG capsule; TAKE ONE(1) CAP BY MOUTH 3 TIMES DAILY WITH FOOD AS NEEDED FOR NERVE PAIN  Dispense: 90 capsule; Refill: 1    18. Aortic atherosclerosis  Assessment & Plan:  Send copy of lab to cardiologist for visit when results are available. Continue vascepa 2 bid, rosuvastatin 10 mg. Will notify with results of lab draw when available. Continue current treatment until results available.              Assessment & Plan    G40.909 Seizure disorder  G81.90 Hemiplegia, unspecified etiology, unspecified hemiplegia type, unspecified laterality  J44.9 Mild chronic obstructive pulmonary disease  E78.2 Mixed hyperlipidemia  E55.9 Vitamin D deficiency  Z12.5 Prostate cancer screening  Z79.899 Long term use of drug  R73.01 Impaired fasting blood sugar  E79.0 Hyperuricemia  K59.00 Constipation,  unspecified constipation type  J31.0 Chronic rhinitis  E87.6 Hypokalemia  K22.70 Bowers's esophagus without dysplasia  F32.A Depression, unspecified depression type  J30.9 Allergic rhinitis, unspecified seasonality, unspecified trigger  I25.10 Coronary artery disease, unspecified vessel or lesion type, unspecified whether angina present, unspecified whether native or transplanted heart  K86.81 Exocrine pancreatic insufficiency  R52 Pain  J30.2 Seasonal allergies  M79.2 Nerve pain  I70.0 Aortic atherosclerosis    IMPRESSION:  - Patient's lung function improved with inhalers, evidenced by clearer breath sounds  - Cardiovascular status stable with persistent mild murmur  - Emotional state improved, likely due to current medication regimen  - Seizure control maintained since initiation of care  - Considering potential adjustment to cholesterol medication pending lab results  - Discontinued unnecessary PPI (Zynpep) to reduce polypharmacy and costs  - Continued current arthritis and gout management due to symptom control    G40.909 SEIZURE DISORDER:  - Monitor the patient's seizure-free status.  - Express satisfaction with the patient's continued seizure-free condition under current care.    J44.9 MILD CHRONIC OBSTRUCTIVE PULMONARY DISEASE:  - Assess the patient's breathing and lung condition.  - Note the patient's report of improved breathing ability and evaluate lungs, which are found to be clear.  - Discuss the patient's smoking history.  - Continue the use of inhalers for COPD management.    E78.2 MIXED HYPERLIPIDEMIA:  - Schedule a follow-up visit after receiving lab results to reassess the need for cholesterol medication adjustment.    Z79.899 LONG TERM USE OF DRUG:  - Continue current medications: Celebrex daily for arthritis, Linzess in the morning for bowel regulation, Zyrtec with nasal spray for allergies, Pantoprazole (Protonix) for acid reflux, gabapentin for sleep aid, Creon for digestive support, and gout  medication.  - Discontinue Zynpep (unnecessary duplicate PPI).    E79.0 HYPERURICEMIA:  - Educate the patient on the importance of fluid intake for gout prevention.  - Continue gout medication.    K59.00 CONSTIPATION, UNSPECIFIED CONSTIPATION TYPE:  - Continue Linzess in the morning for bowel regulation.    J31.0 CHRONIC RHINITIS:  - Educate the patient on Quercetin as an alternative for sinus symptoms that will not affect blood pressure or heart rate.  - Advise the patient to contact the office if sinus symptoms worsen and OTC medications are not sufficient.    K22.70 PAREDES'S ESOPHAGUS WITHOUT DYSPLASIA:  - Continue Pantoprazole (Protonix) for acid reflux management.    J30.9 ALLERGIC RHINITIS, UNSPECIFIED SEASONALITY, UNSPECIFIED TRIGGER:  - Continue Zyrtec with nasal spray for allergy management.    K86.81 EXOCRINE PANCREATIC INSUFFICIENCY:  - Continue Creon for digestive support.    R52 PAIN:  - Continue Celebrex daily for arthritis management.    J30.2 SEASONAL ALLERGIES:  - Continue Zyrtec with nasal spray for allergy management.    M79.2 NERVE PAIN:  - Continue gabapentin to aid with sleep.    G81.90 HEMIPLEGIA, UNSPECIFIED ETIOLOGY, UNSPECIFIED HEMIPLEGIA TYPE, UNSPECIFIED LATERALITY:  - Continue Plavix for stroke prevention.  - Monitor the patient's progress in walking and picking up items.          Future Appointments   Date Time Provider Department Center   5/26/2025  7:45 AM NURSE, [facility] FAMILY MEDICINE [facility]   5/28/2025  8:00 AM [facility]       Follow up in about 3 months (around 5/26/2025). Call sooner if needed.      This note was generated with the assistance of ambient listening technology. Verbal consent was obtained by the patient and accompanying visitor(s) for the recording of patient appointment to facilitate this note. I attest to having reviewed and edited the generated note for accuracy, though some syntax or spelling errors may persist.  Please contact the author of this note for any clarification.      Vanessa Kincaid, DNP

## 2025-02-26 NOTE — ASSESSMENT & PLAN NOTE
Advance Care Planning  Advance Care Planning     Date: 02/26/2025    Today a voluntary meeting took place: Exam Room    Patient Participation: Patient is able to participate       Staff attendees (Name and  Role): Vanessa Kincaid DNP    ACP Conversation (General): Other (specify below) recommend 5 rights and designee     Code Status: Full Code    ACP Documents: Provided ACP documents    Goals of care: The patient endorses that what is most important right now is to focus on quality of life, even if it means sacrificing a little time    Accordingly, we have decided that the best plan to meet the patient's goals includes continuing with treatment      Recommendations/  Follow-up tasks: The patient and health care agent were provided the following recommendations 5 rights       Length of ACP   conversation in minutes: A total of 6 min was spent on advance care planning, goals of care discussion, emotional support, formulating and communicating prognosis and exploring burden/benefit of various approaches of treatment. This discussion occurred on a fully voluntary basis with the verbal consent of the patient and/or family.

## 2025-02-26 NOTE — ASSESSMENT & PLAN NOTE
Send copy of lab to cardiologist for visit when results are available. Continue vascepa 2 bid, rosuvastatin 10 mg. Will notify with results of lab draw when available. Continue current treatment until results available.

## 2025-02-26 NOTE — ASSESSMENT & PLAN NOTE
Father history prostate cancer and desires to check psa. Declines TATI. Notify normal level and can recheck one year.

## 2025-03-03 ENCOUNTER — TELEPHONE (OUTPATIENT)
Dept: FAMILY MEDICINE | Facility: CLINIC | Age: 60
End: 2025-03-03
Payer: MEDICARE

## 2025-03-03 DIAGNOSIS — E78.2 MIXED HYPERLIPIDEMIA: ICD-10-CM

## 2025-03-03 RX ORDER — ICOSAPENT ETHYL 1 G/1
CAPSULE ORAL
Qty: 360 CAPSULE | Refills: 1 | Status: SHIPPED | OUTPATIENT
Start: 2025-03-03

## 2025-03-03 NOTE — TELEPHONE ENCOUNTER
----- Message from Lesvia sent at 3/3/2025  7:56 AM CST -----  Patient needs refill on Vacepa 1 gr called into [facility]

## 2025-04-07 ENCOUNTER — TELEPHONE (OUTPATIENT)
Dept: FAMILY MEDICINE | Facility: CLINIC | Age: 60
End: 2025-04-07
Payer: MEDICARE

## 2025-04-07 DIAGNOSIS — G40.409 OTHER GENERALIZED EPILEPSY, NOT INTRACTABLE, WITHOUT STATUS EPILEPTICUS: ICD-10-CM

## 2025-04-07 RX ORDER — LEVETIRACETAM 500 MG/1
TABLET ORAL
Qty: 270 TABLET | Refills: 0 | Status: SHIPPED | OUTPATIENT
Start: 2025-04-07

## 2025-04-21 DIAGNOSIS — R10.9 STOMACH CRAMPS: Primary | ICD-10-CM

## 2025-04-21 DIAGNOSIS — E79.0 HYPERURICEMIA: ICD-10-CM

## 2025-04-21 RX ORDER — DICYCLOMINE HYDROCHLORIDE 20 MG/1
TABLET ORAL
Qty: 90 TABLET | Refills: 1 | Status: SHIPPED | OUTPATIENT
Start: 2025-04-21

## 2025-04-21 RX ORDER — ALLOPURINOL 300 MG/1
300 TABLET ORAL DAILY
Qty: 90 TABLET | Refills: 1 | Status: SHIPPED | OUTPATIENT
Start: 2025-04-21

## 2025-04-21 NOTE — TELEPHONE ENCOUNTER
----- Message from Pebbles sent at 4/21/2025  8:40 AM CDT -----  Regarding: Med refill  Needs Allopurinal sent to [facility]

## 2025-05-05 ENCOUNTER — TELEPHONE (OUTPATIENT)
Dept: FAMILY MEDICINE | Facility: CLINIC | Age: 60
End: 2025-05-05
Payer: MEDICARE

## 2025-05-05 DIAGNOSIS — E55.9 VITAMIN D DEFICIENCY: ICD-10-CM

## 2025-05-05 RX ORDER — ASPIRIN 325 MG
50000 TABLET, DELAYED RELEASE (ENTERIC COATED) ORAL
Qty: 26 CAPSULE | Refills: 1 | Status: SHIPPED | OUTPATIENT
Start: 2025-05-05 | End: 2025-05-06 | Stop reason: SDUPTHER

## 2025-05-05 NOTE — TELEPHONE ENCOUNTER
----- Message from Pebbles sent at 5/5/2025 10:06 AM CDT -----  Regarding: Med refill  Needs Vitamin D sent to [facility]

## 2025-05-06 ENCOUNTER — TELEPHONE (OUTPATIENT)
Dept: FAMILY MEDICINE | Facility: CLINIC | Age: 60
End: 2025-05-06
Payer: MEDICARE

## 2025-05-06 DIAGNOSIS — E55.9 VITAMIN D DEFICIENCY: ICD-10-CM

## 2025-05-06 RX ORDER — ASPIRIN 325 MG
50000 TABLET, DELAYED RELEASE (ENTERIC COATED) ORAL
Qty: 26 CAPSULE | Refills: 1 | Status: SHIPPED | OUTPATIENT
Start: 2025-05-08

## 2025-05-06 NOTE — TELEPHONE ENCOUNTER
----- Message from Lesvia sent at 5/6/2025  8:15 AM CDT -----  Patient's Dad called stating [facility] never received Vitamin D refill request . Patient is due to take vit D tomorrow

## 2025-05-19 ENCOUNTER — TELEPHONE (OUTPATIENT)
Dept: FAMILY MEDICINE | Facility: CLINIC | Age: 60
End: 2025-05-19
Payer: MEDICARE

## 2025-05-19 DIAGNOSIS — J30.9 ALLERGIC RHINITIS, UNSPECIFIED SEASONALITY, UNSPECIFIED TRIGGER: ICD-10-CM

## 2025-05-19 DIAGNOSIS — K59.00 CONSTIPATION, UNSPECIFIED CONSTIPATION TYPE: ICD-10-CM

## 2025-05-19 DIAGNOSIS — R10.9 STOMACH CRAMPS: ICD-10-CM

## 2025-05-19 RX ORDER — DICYCLOMINE HYDROCHLORIDE 20 MG/1
TABLET ORAL
Qty: 90 TABLET | Refills: 2 | OUTPATIENT
Start: 2025-05-19

## 2025-05-19 RX ORDER — FLUTICASONE PROPIONATE 50 MCG
SPRAY, SUSPENSION (ML) NASAL
Qty: 48 G | Refills: 2 | OUTPATIENT
Start: 2025-05-19

## 2025-05-19 NOTE — TELEPHONE ENCOUNTER
----- Message from Pebbles sent at 5/19/2025  8:31 AM CDT -----  Regarding: Med refill  Needs Linzess sent to [facility]

## 2025-05-20 NOTE — TELEPHONE ENCOUNTER
----- Message from Pebbles sent at 5/20/2025  7:56 AM CDT -----  Regarding: FW: Med refill  Please resend his Linzess refill to [facility], the pharmacy states they did not get the refill request  ----- Message -----  From: Pebbles Garcia  Sent: 5/19/2025   8:35 AM CDT  To: Codi Mendez Staff  Subject: Med refill                                       [facility]

## 2025-05-26 ENCOUNTER — RESULTS FOLLOW-UP (OUTPATIENT)
Dept: FAMILY MEDICINE | Facility: CLINIC | Age: 60
End: 2025-05-26

## 2025-05-26 PROCEDURE — 82306 VITAMIN D 25 HYDROXY: CPT | Performed by: NURSE PRACTITIONER

## 2025-05-26 PROCEDURE — 84550 ASSAY OF BLOOD/URIC ACID: CPT | Performed by: NURSE PRACTITIONER

## 2025-05-26 PROCEDURE — 80177 DRUG SCRN QUAN LEVETIRACETAM: CPT | Performed by: NURSE PRACTITIONER

## 2025-05-26 PROCEDURE — 84443 ASSAY THYROID STIM HORMONE: CPT | Performed by: NURSE PRACTITIONER

## 2025-05-26 PROCEDURE — 80061 LIPID PANEL: CPT | Performed by: NURSE PRACTITIONER

## 2025-05-26 PROCEDURE — 80053 COMPREHEN METABOLIC PANEL: CPT | Performed by: NURSE PRACTITIONER

## 2025-05-28 ENCOUNTER — TELEPHONE (OUTPATIENT)
Dept: FAMILY MEDICINE | Facility: CLINIC | Age: 60
End: 2025-05-28

## 2025-05-28 ENCOUNTER — OFFICE VISIT (OUTPATIENT)
Dept: FAMILY MEDICINE | Facility: CLINIC | Age: 60
End: 2025-05-28
Payer: MEDICARE

## 2025-05-28 VITALS
TEMPERATURE: 99 F | OXYGEN SATURATION: 96 % | HEART RATE: 83 BPM | HEIGHT: 69 IN | BODY MASS INDEX: 29.92 KG/M2 | SYSTOLIC BLOOD PRESSURE: 120 MMHG | DIASTOLIC BLOOD PRESSURE: 72 MMHG | WEIGHT: 202 LBS

## 2025-05-28 DIAGNOSIS — E78.2 MIXED HYPERLIPIDEMIA: ICD-10-CM

## 2025-05-28 DIAGNOSIS — F32.A DEPRESSION, UNSPECIFIED DEPRESSION TYPE: ICD-10-CM

## 2025-05-28 DIAGNOSIS — G40.909 SEIZURE DISORDER: Primary | ICD-10-CM

## 2025-05-28 DIAGNOSIS — I10 BENIGN ESSENTIAL HTN: ICD-10-CM

## 2025-05-28 DIAGNOSIS — I25.10 CORONARY ARTERY DISEASE, UNSPECIFIED VESSEL OR LESION TYPE, UNSPECIFIED WHETHER ANGINA PRESENT, UNSPECIFIED WHETHER NATIVE OR TRANSPLANTED HEART: ICD-10-CM

## 2025-05-28 DIAGNOSIS — J30.2 SEASONAL ALLERGIES: ICD-10-CM

## 2025-05-28 DIAGNOSIS — J44.9 MILD CHRONIC OBSTRUCTIVE PULMONARY DISEASE: ICD-10-CM

## 2025-05-28 DIAGNOSIS — M79.2 NERVE PAIN: ICD-10-CM

## 2025-05-28 DIAGNOSIS — Z71.89 ACP (ADVANCE CARE PLANNING): ICD-10-CM

## 2025-05-28 DIAGNOSIS — I70.0 AORTIC ATHEROSCLEROSIS: ICD-10-CM

## 2025-05-28 DIAGNOSIS — G81.91 HEMIPLEGIA AFFECTING RIGHT DOMINANT SIDE, UNSPECIFIED ETIOLOGY, UNSPECIFIED HEMIPLEGIA TYPE: ICD-10-CM

## 2025-05-28 DIAGNOSIS — G40.409 OTHER GENERALIZED EPILEPSY, NOT INTRACTABLE, WITHOUT STATUS EPILEPTICUS: ICD-10-CM

## 2025-05-28 DIAGNOSIS — Z87.820 HISTORY OF TRAUMATIC BRAIN INJURY: ICD-10-CM

## 2025-05-28 DIAGNOSIS — E87.6 HYPOKALEMIA: ICD-10-CM

## 2025-05-28 DIAGNOSIS — E79.0 HYPERURICEMIA: ICD-10-CM

## 2025-05-28 DIAGNOSIS — F32.5 MAJOR DEPRESSIVE DISORDER WITH SINGLE EPISODE, IN FULL REMISSION: ICD-10-CM

## 2025-05-28 DIAGNOSIS — K59.00 CONSTIPATION, UNSPECIFIED CONSTIPATION TYPE: ICD-10-CM

## 2025-05-28 DIAGNOSIS — K22.70 BARRETT'S ESOPHAGUS WITHOUT DYSPLASIA: ICD-10-CM

## 2025-05-28 PROCEDURE — G0439 PPPS, SUBSEQ VISIT: HCPCS | Mod: ,,, | Performed by: NURSE PRACTITIONER

## 2025-05-28 RX ORDER — PANTOPRAZOLE SODIUM 40 MG/1
40 TABLET, DELAYED RELEASE ORAL DAILY
Qty: 30 TABLET | Refills: 2 | Status: SHIPPED | OUTPATIENT
Start: 2025-05-28

## 2025-05-28 RX ORDER — CELECOXIB 200 MG/1
CAPSULE ORAL
Qty: 90 CAPSULE | Refills: 3 | Status: SHIPPED | OUTPATIENT
Start: 2025-05-28

## 2025-05-28 RX ORDER — ALLOPURINOL 300 MG/1
300 TABLET ORAL DAILY
Qty: 90 TABLET | Refills: 1 | Status: SHIPPED | OUTPATIENT
Start: 2025-05-28

## 2025-05-28 RX ORDER — ROSUVASTATIN CALCIUM 10 MG/1
10 TABLET, COATED ORAL NIGHTLY
Qty: 90 TABLET | Refills: 1 | Status: SHIPPED | OUTPATIENT
Start: 2025-05-28

## 2025-05-28 RX ORDER — POTASSIUM CHLORIDE 750 MG/1
10 TABLET, EXTENDED RELEASE ORAL 2 TIMES DAILY
Qty: 90 TABLET | Refills: 1 | Status: SHIPPED | OUTPATIENT
Start: 2025-05-28

## 2025-05-28 RX ORDER — UMECLIDINIUM BROMIDE AND VILANTEROL TRIFENATATE 62.5; 25 UG/1; UG/1
1 POWDER RESPIRATORY (INHALATION) DAILY
Qty: 180 EACH | Refills: 2 | Status: SHIPPED | OUTPATIENT
Start: 2025-05-28

## 2025-05-28 RX ORDER — CETIRIZINE HYDROCHLORIDE 10 MG/1
TABLET ORAL
Qty: 90 TABLET | Refills: 1 | Status: SHIPPED | OUTPATIENT
Start: 2025-05-28

## 2025-05-28 RX ORDER — AMLODIPINE AND BENAZEPRIL HYDROCHLORIDE 10; 20 MG/1; MG/1
1 CAPSULE ORAL DAILY
Qty: 90 CAPSULE | Refills: 1 | Status: SHIPPED | OUTPATIENT
Start: 2025-05-28

## 2025-05-28 RX ORDER — LEVETIRACETAM 500 MG/1
TABLET ORAL
Qty: 270 TABLET | Refills: 0 | Status: SHIPPED | OUTPATIENT
Start: 2025-05-28

## 2025-05-28 RX ORDER — CLOPIDOGREL BISULFATE 75 MG/1
TABLET ORAL
Qty: 90 TABLET | Refills: 1 | Status: SHIPPED | OUTPATIENT
Start: 2025-05-28

## 2025-05-28 RX ORDER — GABAPENTIN 400 MG/1
CAPSULE ORAL
Qty: 90 CAPSULE | Refills: 1 | Status: SHIPPED | OUTPATIENT
Start: 2025-05-28

## 2025-05-28 RX ORDER — ASPIRIN 81 MG/1
81 TABLET ORAL DAILY
Qty: 90 TABLET | Refills: 1 | Status: SHIPPED | OUTPATIENT
Start: 2025-05-28

## 2025-05-28 RX ORDER — ICOSAPENT ETHYL 1 G/1
CAPSULE ORAL
Qty: 360 CAPSULE | Refills: 1 | Status: SHIPPED | OUTPATIENT
Start: 2025-05-28

## 2025-05-28 RX ORDER — ESCITALOPRAM OXALATE 20 MG/1
TABLET ORAL
Qty: 90 TABLET | Refills: 1 | Status: SHIPPED | OUTPATIENT
Start: 2025-05-28

## 2025-05-28 NOTE — ASSESSMENT & PLAN NOTE
Keppra 500 mg and no seizure since living with father. No side effects with medications The current medical regimen is effective;  continue present plan and medications.

## 2025-05-28 NOTE — ASSESSMENT & PLAN NOTE
Brain injury in his late 20s and has been living with chronic right-sided weakness and pain to the right side since then but currently stable with all his chronic conditions living with his father and not experiencing any new changes at this.  We will notify with the lab when it is available and continue current medications with walking routine.

## 2025-05-28 NOTE — ASSESSMENT & PLAN NOTE
Advance Care Planning     Date: 05/28/2025  Discussed with father and patient the 5 right and 5 wishes the purpose of a living will and a surrogate to be able to be for you a copy of the 5 right or wishes was provided and instructions to complete and if he desires to bring back completed paper to put with chart.  This can be changed at anytime or revoked at anytime based on patient's decision.  Total counseling 10 minutes

## 2025-05-28 NOTE — TELEPHONE ENCOUNTER
I called and spoke with pts father and he stated they would be interesting in the program where the do counseling over the phone and all that.

## 2025-05-28 NOTE — PROGRESS NOTES
Somebody told me that putting the   Ambulatory [facility]      Patient ID: [id number]     Chief Complaint: Medicare Annual Wellness     HPI:     Phan Saenz is a 60 y.o. male here today for a Medicare Annual Wellness visit and comprehensive Health Risk Assessment.     A separate E/M code has been provided to evaluate additional complaints that the patient would like addressed during the dedicated Medicare Wellness Exam.    HPI: Patient is here for his Medicare wellness. He currently does not have any new problems or concerns. Patient does not know the medications he is taking and did not bring them with him to his appointment.  He is here with his father and they live together he has not been walking as much as he had in the past and is drinking more soft drinks but not experiencing problems other than the right-sided pain and finds only partial relief of the pain with his measures. Patient and his father agree to referral for Outpatient Care Management.     Health Maintenance   Topic Date Due    Aspirin/Antiplatelet Therapy  05/28/2026    TETANUS VACCINE  02/06/2028    Hemoglobin A1c (Diabetic Prevention Screening)  02/26/2028    Lipid Panel  05/26/2030    Colorectal Cancer Screening  09/23/2031    RSV Vaccine (Age 60+ and Pregnant patients) (1 - 1-dose 75+ series) 03/24/2042    Hepatitis C Screening  Completed    Shingles Vaccine  Completed    Influenza Vaccine  Completed    HIV Screening  Completed    Pneumococcal Vaccines (Age 50+)  Completed    High Dose Statin  Discontinued    COVID-19 Vaccine  Discontinued        Past Medical History:   Diagnosis Date    Alcohol dependence with intoxication, unspecified     Arthropathy, unspecified     Benign essential HTN     Chronic constipation     COPD (chronic obstructive pulmonary disease)     Coronary arteriosclerosis     Erosive esophagitis     GERD (gastroesophageal reflux disease)     H/O metabolic and nutritional disorder     Harmful pattern of use of  nicotine     Hemiplegia     History of iron deficiency     History of traumatic brain injury     Hypokalemia     Major depressive disorder, single episode, unspecified     Mild chronic obstructive pulmonary disease     Mixed anxiety and depressive disorder     Mixed hyperlipidemia     Neuralgia     Nicotine dependence     Obstructive sleep apnea syndrome     Other seasonal allergic rhinitis     Seizure disorder     Tobacco user     Unspecified viral hepatitis C without hepatic coma     Vascular insufficiency     Vitamin B12 deficiency     Vitamin D deficiency         Past Surgical History:   Procedure Laterality Date    CHOLECYSTECTOMY  02/06/2020    COLONOSCOPY  01/24/2020    CRANIOTOMY  01/01/1994    ESOPHAGOGASTRODUODENOSCOPY  01/24/2020        Social History     Socioeconomic History    Marital status: Single   Tobacco Use    Smoking status: Every Day     Current packs/day: 1.00     Types: Cigarettes     Passive exposure: Current    Smokeless tobacco: Current   Substance and Sexual Activity    Alcohol use: Not Currently    Drug use: Never    Sexual activity: Not Currently     Social Drivers of Health     Financial Resource Strain: Low Risk  (5/1/2023)    Overall Financial Resource Strain (CARDIA)     Difficulty of Paying Living Expenses: Not very hard   Food Insecurity: No Food Insecurity (5/1/2023)    Hunger Vital Sign     Worried About Running Out of Food in the Last Year: Never true     Ran Out of Food in the Last Year: Never true   Transportation Needs: No Transportation Needs (5/1/2023)    PRAPARE - Transportation     Lack of Transportation (Medical): No     Lack of Transportation (Non-Medical): No   Physical Activity: Sufficiently Active (5/1/2023)    Exercise Vital Sign     Days of Exercise per Week: 5 days     Minutes of Exercise per Session: 30 min   Stress: No Stress Concern Present (5/1/2023)    [employer] - Occupational Stress Questionnaire     Feeling of Stress : Only a  "little   Housing Stability: Low Risk  (5/1/2023)    Housing Stability Vital Sign     Unable to Pay for Housing in the Last Year: No     Number of Places Lived in the Last Year: 1     Unstable Housing in the Last Year: No        No family history on file.     Current Outpatient Medications   Medication Instructions    allopurinoL (ZYLOPRIM) 300 mg, Oral, Daily    amlodipine-benazepril 10-20mg (LOTREL) 10-20 mg per capsule 1 capsule, Oral, Daily    aspirin (ECOTRIN) 81 mg, Oral, Daily    azelastine (ASTELIN) 137 mcg, Nasal, 2 times daily    BD LUER-KUNAL SYRINGE 3 mL 25 x 5/8" Syrg USE WITH B-12    celecoxib (CELEBREX) 200 MG capsule Take 1 tablet twice daily if needed for pain and arthritis but take it with food and avoid any additional pain medicines such as ibuprofen.    cetirizine (ZYRTEC) 10 MG tablet TAKE ONE(1) TAB BY MOUTH EVERY NIGHT AT BEDTIME FOR SINUS, ALLERGY, &/OR CONGESTION    cholecalciferol (vitamin D3) 50,000 Units, Oral, Twice weekly, Dose increase    clopidogreL (PLAVIX) 75 mg tablet TAKE ONE(1) TAB BY MOUTH ONCE A DAY FOR BLOOD THINNER TO HELP PREVENT HEART ATTACK &/OR STROKE    colchicine (COLCRYS) 0.6 mg, 2 times daily PRN    CREON 36,000-114,000- 180,000 unit CpDR 2 capsules, Oral, Daily    cyanocobalamin/folic acid (VITAMIN B12-FOLIC ACID) 1,000-400 mcg Lozg 1 mcg, Oral, Daily    dicyclomine (BENTYL) 20 mg tablet TAKE ONE(1) TAB BY MOUTH 3 TIMES DAILY AS NEEDED FOR ABDOMINAL PAIN    EScitalopram oxalate (LEXAPRO) 20 MG tablet TAKE ONE(1) TAB BY MOUTH EVERY NIGHT AT BEDTIME FOR DEPRESSION, ANXIETY, &/OR IRRITABILITY    fluticasone propionate (FLONASE) 50 mcg, Each Nostril, Daily    gabapentin (NEURONTIN) 400 MG capsule TAKE ONE(1) CAP BY MOUTH 3 TIMES DAILY WITH FOOD AS NEEDED FOR NERVE PAIN    icosapent ethyL (VASCEPA) 1 gram Cap See Instructions, TAKE TWO(2) CAPS BY MOUTH TWICE DAILY FOR CHOLESTEROL ** MUTLIPLE BOTTLES **, # 360 cap(s), 1 Refill(s), Pharmacy: [facility], 177.5, cm, " Height/Length Dosing, 06/29/22 9:46:00 CDT, 102.05, kg, Weight Dosing, 06/29/22 9:4...    ketoconazole (NIZORAL) 2 % cream Topical (Top), Daily, Apply once a day to rash 2 weeks then decrease to 3 times a week as needed for rash    levETIRAcetam (KEPPRA) 500 MG Tab TAKE ONE&ONE HALF (1&1/2) TABS BY MOUTH TWICE DAILY WITH FOOD FOR SEIZURES  MULTIPLE BOTTLES    linaCLOtide (LINZESS) 145 mcg, Oral, Before breakfast    pantoprazole (PROTONIX) 40 mg, Oral, Daily    potassium chloride (KLOR-CON) 10 MEQ TbSR 10 mEq, Oral, 2 times daily    rosuvastatin (CRESTOR) 10 mg, Oral, Nightly    umeclidinium-vilanteroL (ANORO ELLIPTA) 62.5-25 mcg/actuation DsDv 1 puff, Inhalation, Daily, Controller       Review of patient's allergies indicates:  No Known Allergies     Immunization History   Administered Date(s) Administered    COVID-19, MRNA, LN-S, PF (Pfizer) (Purple Cap) 03/24/2021, 04/14/2021, 02/03/2022    Influenza - Intradermal - Trivalent - PF 10/01/2013    Influenza - Quadrivalent 09/28/2016, 11/07/2018, 10/21/2020, 09/20/2021    Influenza - Quadrivalent - MDCK - PF 09/24/2019    Influenza - Quadrivalent - PF *Preferred* (6 months and older) 09/27/2010, 11/01/2011, 11/04/2014, 11/10/2015, 09/28/2016, 11/06/2017, 11/07/2018, 10/21/2020, 09/20/2021, 10/10/2022, 09/06/2023    Influenza - Trivalent - Fluarix, Flulaval, Fluzone, Afluria - PF 09/27/2010, 11/01/2011, 09/28/2016, 11/07/2018, 09/12/2024    Influenza Whole 09/11/2012    Pneumococcal Conjugate - 13 Valent 11/06/2017    Pneumococcal Conjugate - 20 Valent 08/05/2024    Tdap 02/06/2018    Zoster 02/06/2018    Zoster Recombinant 07/15/2021, 09/20/2021        Patient Care Team:  Vanessa Kincaid DNP as PCP - General (Family Medicine)  Su Allen FNP as Nurse Practitioner (Cardiology)    Subjective:     Review of Systems   HENT: Negative.     Respiratory: Negative.     Gastrointestinal:  Positive for anal bleeding and constipation (taking linzess and working well).  "Negative for abdominal pain.   Genitourinary:  Negative for hematuria.   Musculoskeletal:  Positive for arthralgias and gait problem.   Neurological:  Positive for weakness. Negative for dizziness, seizures, speech difficulty and light-headedness.       12 point review of systems conducted, negative except as stated in the history of present illness. See HPI for details.    Objective:     Visit Vitals  /72 (BP Location: Left arm, Patient Position: Sitting)   Pulse 83   Temp 98.7 °F (37.1 °C)   Ht 5' 9" (1.753 m)   Wt 91.6 kg (202 lb)   SpO2 96%   BMI 29.83 kg/m²       Physical Exam  Vitals and nursing note reviewed.   Constitutional:       General: He is not in acute distress.     Appearance: He is obese. He is not ill-appearing.   HENT:      Head: Normocephalic and atraumatic.      Mouth/Throat:      Mouth: Mucous membranes are moist.      Pharynx: Oropharynx is clear.   Eyes:      General: No scleral icterus.     Extraocular Movements: Extraocular movements intact.      Conjunctiva/sclera: Conjunctivae normal.      Pupils: Pupils are equal, round, and reactive to light.   Neck:      Vascular: No carotid bruit.   Cardiovascular:      Rate and Rhythm: Normal rate and regular rhythm.      Pulses: Normal pulses.      Heart sounds: Normal heart sounds. No murmur heard.     No friction rub. No gallop.   Pulmonary:      Effort: Pulmonary effort is normal. No respiratory distress.      Breath sounds: Normal breath sounds. No wheezing, rhonchi or rales.   Abdominal:      General: Abdomen is flat. Bowel sounds are normal. There is no distension.      Palpations: Abdomen is soft. There is no mass.      Tenderness: There is no abdominal tenderness.   Musculoskeletal:      Cervical back: Normal range of motion and neck supple.      Comments: Right sided weakness   Skin:     General: Skin is warm and dry.   Neurological:      General: No focal deficit present.      Mental Status: He is alert.   Psychiatric:         Mood " and Affect: Mood normal.         Labs Reviewed:  Chemistry:  Lab Results   Component Value Date     05/26/2025    K 3.7 05/26/2025    BUN 8 05/26/2025    CREATININE 0.97 05/26/2025    EGFRNORACEVR 90 05/26/2025    CALCIUM 9.8 05/26/2025    ALKPHOS 88 05/26/2025    ALBUMIN 4.3 05/26/2025    BILIDIR  05/06/2024      Comment:      Unable to calculate due to low unconjugated bili result    AST 20 05/26/2025    ALT 16 05/26/2025    PHOS 3.8 02/05/2024    GPLQLSNZ12IP 42 05/26/2025    TSH 2.310 05/26/2025    PSA 0.40 02/26/2025        Lab Results   Component Value Date    HGBA1C 5.6 02/26/2025        Hematology:  Lab Results   Component Value Date    WBC 6.42 02/26/2025    RBC 4.19 02/26/2025    HGB 13.3 02/26/2025    HCT 39.0 02/26/2025    MCV 93.1 02/26/2025    MCH 31.7 02/26/2025    MCHC 34.1 02/26/2025    RDW 13.4 02/26/2025     02/26/2025    MPV 9.5 02/26/2025    BASO 0.05 09/04/2023       Lipid Panel:  Lab Results   Component Value Date    CHOL 112 05/26/2025    HDL 35 (L) 05/26/2025    TRIG 150 05/26/2025        Assessment:       ICD-10-CM ICD-9-CM   1. Seizure disorder  G40.909 345.90   2. History of traumatic brain injury  Z87.820 V15.52   3. Hemiplegia affecting right dominant side, unspecified etiology, unspecified hemiplegia type  G81.91 342.91   4. Mild chronic obstructive pulmonary disease  J44.9 496   5. Mixed hyperlipidemia  E78.2 272.2   6. Hypokalemia  E87.6 276.8   7. Other generalized epilepsy, not intractable, without status epilepticus  G40.409 345.90   8. Nerve pain  M79.2 729.2   9. Hyperuricemia  E79.0 790.6   10. Benign essential HTN  I10 401.1   11. Coronary artery disease, unspecified vessel or lesion type, unspecified whether angina present, unspecified whether native or transplanted heart  I25.10 414.00   12. Seasonal allergies  J30.2 477.9   13. Aortic atherosclerosis  I70.0 440.0   14. Depression, unspecified depression type  F32.A 311   15. Constipation, unspecified  constipation type  K59.00 564.00   16. Bowers's esophagus without dysplasia  K22.70 530.85   17. ACP (advance care planning)  Z71.89 V65.49   18. Major depressive disorder with single episode, in full remission  F32.5 296.26        Plan:     1. Seizure disorder  Assessment & Plan:  Keppra 500 mg and no seizure since living with father. No side effects with medications The current medical regimen is effective;  continue present plan and medications.        2. History of traumatic brain injury  Assessment & Plan:  Brain injury in his late 20s and has been living with chronic right-sided weakness and pain to the right side since then but currently stable with all his chronic conditions living with his father and not experiencing any new changes at this.  We will notify with the lab when it is available and continue current medications with walking routine.       3. Hemiplegia affecting right dominant side, unspecified etiology, unspecified hemiplegia type  Assessment & Plan:  Hemiplegia continues to right side but has not had any falls able to live at home with his father walking in the yd without fall. Continue current activity and notify any problems.       4. Mild chronic obstructive pulmonary disease  -     umeclidinium-vilanteroL (ANORO ELLIPTA) 62.5-25 mcg/actuation DsDv; Inhale 1 puff into the lungs Daily. Controller  Dispense: 180 each; Refill: 2    5. Mixed hyperlipidemia  -     rosuvastatin (CRESTOR) 10 MG tablet; Take 1 tablet (10 mg total) by mouth every evening.  Dispense: 90 tablet; Refill: 1  -     potassium chloride (KLOR-CON) 10 MEQ TbSR; Take 1 tablet (10 mEq total) by mouth 2 (two) times daily.  Dispense: 90 tablet; Refill: 1  -     icosapent ethyL (VASCEPA) 1 gram Cap; See Instructions, TAKE TWO(2) CAPS BY MOUTH TWICE DAILY FOR CHOLESTEROL ** MUTLIPLE BOTTLES **, # 360 cap(s), 1 Refill(s), Pharmacy: [facility], 177.5, cm, Height/Length Dosing, 06/29/22 9:46:00 CDT, 102.05, kg, Weight  Dosing, 06/29/22 9:4...  Dispense: 360 capsule; Refill: 1    6. Hypokalemia  -     potassium chloride (KLOR-CON) 10 MEQ TbSR; Take 1 tablet (10 mEq total) by mouth 2 (two) times daily.  Dispense: 90 tablet; Refill: 1    7. Other generalized epilepsy, not intractable, without status epilepticus  -     levETIRAcetam (KEPPRA) 500 MG Tab; TAKE ONE&ONE HALF (1&1/2) TABS BY MOUTH TWICE DAILY WITH FOOD FOR SEIZURES  MULTIPLE BOTTLES  Dispense: 270 tablet; Refill: 0    8. Nerve pain  -     celecoxib (CELEBREX) 200 MG capsule; Take 1 tablet twice daily if needed for pain and arthritis but take it with food and avoid any additional pain medicines such as ibuprofen.  Dispense: 90 capsule; Refill: 3  -     gabapentin (NEURONTIN) 400 MG capsule; TAKE ONE(1) CAP BY MOUTH 3 TIMES DAILY WITH FOOD AS NEEDED FOR NERVE PAIN  Dispense: 90 capsule; Refill: 1    9. Hyperuricemia  -     allopurinoL (ZYLOPRIM) 300 MG tablet; Take 1 tablet (300 mg total) by mouth once daily.  Dispense: 90 tablet; Refill: 1    10. Benign essential HTN  -     amlodipine-benazepril 10-20mg (LOTREL) 10-20 mg per capsule; Take 1 capsule by mouth once daily.  Dispense: 90 capsule; Refill: 1    11. Coronary artery disease, unspecified vessel or lesion type, unspecified whether angina present, unspecified whether native or transplanted heart  -     aspirin (ECOTRIN) 81 MG EC tablet; Take 1 tablet (81 mg total) by mouth once daily.  Dispense: 90 tablet; Refill: 1    12. Seasonal allergies  -     cetirizine (ZYRTEC) 10 MG tablet; TAKE ONE(1) TAB BY MOUTH EVERY NIGHT AT BEDTIME FOR SINUS, ALLERGY, &/OR CONGESTION  Dispense: 90 tablet; Refill: 1    13. Aortic atherosclerosis  -     clopidogreL (PLAVIX) 75 mg tablet; TAKE ONE(1) TAB BY MOUTH ONCE A DAY FOR BLOOD THINNER TO HELP PREVENT HEART ATTACK &/OR STROKE  Dispense: 90 tablet; Refill: 1    14. Depression, unspecified depression type  -     EScitalopram oxalate (LEXAPRO) 20 MG tablet; TAKE ONE(1) TAB BY MOUTH EVERY  NIGHT AT BEDTIME FOR DEPRESSION, ANXIETY, &/OR IRRITABILITY  Dispense: 90 tablet; Refill: 1    15. Constipation, unspecified constipation type  -     linaCLOtide (LINZESS) 145 mcg Cap capsule; Take 1 capsule (145 mcg total) by mouth before breakfast.  Dispense: 90 capsule; Refill: 1    16. Bowers's esophagus without dysplasia  Overview:  Dr Bryant visit continue pantoprazole 40 mg daily. F/u 1 year if needed sooner    Orders:  -     pantoprazole (PROTONIX) 40 MG tablet; Take 1 tablet (40 mg total) by mouth once daily.  Dispense: 30 tablet; Refill: 2    17. ACP (advance care planning)  Assessment & Plan:  Advance Care Planning    Date: 05/28/2025  Discussed with father and patient the 5 right and 5 wishes the purpose of a living will and a surrogate to be able to be for you a copy of the 5 right or wishes was provided and instructions to complete and if he desires to bring back completed paper to put with chart.  This can be changed at anytime or revoked at anytime based on patient's decision.  Total counseling 10 minutes           18. Major depressive disorder with single episode, in full remission         The following assessments were completed and reviewed. See completed screening forms and assessments within the Encounter Summary.   [x] Health Risk Assessment   [x] CVD Risk Factors   [x] Obesity/Physical Activity -  Encouraged daily 30 minute physical activity x 5 days per week.   [x] Home Safety/Living Situation   [x] Alcohol Screen  [x] Depression (PHQ) Screen   [x] Timed Get Up and Go   [x] Whisper Test  [x] Cognitive Function/Impairment Screen   [x] Nutrition Screening  [x] ADL Screen   [x] Opioid Screen:  [x] Patient does not have a prescription for opioids.   [] Patient has a prescription for opioids but is at low risk for abuse.   [x] Substance Abuse Screen:   [x] Patient does not use substances.   [] Patient screens positive for substance use disorder.   Advance Care Planning     Date: 05/28/2025  5  Wishes booklet provided with a thorough explanation.  Has been for 10 minutes with patient father.  Instructed to complete and bring completed copy to place and chart.       I attest to discussing Advance Care Planning with patient and/or family member.  Education was provided including the importance of the Health Care Power of , Advance Directives, and/or [place] documentation.  The patient expressed understanding to the importance of this information and discussion.    Provided patient with a 5-10 year written screening schedule and personal prevention plan. Recommendations were developed using the USPSTF age appropriate recommendations. Education, counseling, and referrals were provided as needed. After Visit Summary printed and given to patient, which includes a list of additional screenings\tests needed.    Follow up in about 3 months (around 8/28/2025). In addition to their scheduled follow up, the patient has also been instructed to follow up on as needed basis.     Future Appointments   Date Time Provider Department Center   8/25/2025  8:40 AM Vanessa Kincaid DNP [facility]   11/24/2025  8:15 AM [facility]UR[facility], [facility]

## 2025-05-28 NOTE — TELEPHONE ENCOUNTER
----- Message from Lesvia sent at 5/28/2025 12:57 PM CDT -----  Patient's dad returned a call from our clinic concerning a vaccine

## 2025-05-28 NOTE — PATIENT INSTRUCTIONS
Laureano Chisholm,     If you are due for any health screening(s) below please notify me so we can arrange them to be ordered and scheduled. Most healthy patients at your age complete them, but you are free to accept or refuse.     If you can't do it, I'll definitely understand. If you can, I'd certainly appreciate it!    Tests to Keep You Healthy    Colon Cancer Screening: Met on 9/23/2024  Last Blood Pressure <= 139/89 (5/28/2025): Yes  Tobacco Cessation: NO      Were here to help you quit smoking     Our records indicated that you are still smoking. One of the best things you can do for your health is to stop smoking and we are here to help.     Talk with your provider about our Smoking Cessation Program and how we can support you on your journey.

## 2025-06-02 ENCOUNTER — OUTPATIENT CASE MANAGEMENT (OUTPATIENT)
Dept: ADMINISTRATIVE | Facility: OTHER | Age: 60
End: 2025-06-02
Payer: MEDICARE

## 2025-06-03 ENCOUNTER — OFFICE VISIT (OUTPATIENT)
Dept: FAMILY MEDICINE | Facility: CLINIC | Age: 60
End: 2025-06-03
Payer: MEDICARE

## 2025-06-03 VITALS
SYSTOLIC BLOOD PRESSURE: 130 MMHG | OXYGEN SATURATION: 97 % | TEMPERATURE: 99 F | HEART RATE: 72 BPM | BODY MASS INDEX: 30.96 KG/M2 | HEIGHT: 69 IN | WEIGHT: 209 LBS | DIASTOLIC BLOOD PRESSURE: 80 MMHG

## 2025-06-03 DIAGNOSIS — R10.11 RIGHT UPPER QUADRANT ABDOMINAL PAIN: ICD-10-CM

## 2025-06-03 DIAGNOSIS — Z09 FOLLOW-UP EXAM AFTER TREATMENT: Primary | ICD-10-CM

## 2025-06-03 LAB
BILIRUB SERPL-MCNC: NEGATIVE MG/DL
BLOOD URINE, POC: NEGATIVE
CLARITY, POC UA: CLEAR
COLOR, POC UA: YELLOW
GLUCOSE UR QL STRIP: NEGATIVE
KETONES UR QL STRIP: NEGATIVE
LEUKOCYTE ESTERASE URINE, POC: NEGATIVE
NITRITE, POC UA: NEGATIVE
PH, POC UA: 6
PROTEIN, POC: NEGATIVE
SPECIFIC GRAVITY, POC UA: 1.02
UROBILINOGEN, POC UA: NORMAL

## 2025-06-03 PROCEDURE — 81002 URINALYSIS NONAUTO W/O SCOPE: CPT | Mod: RHCUB | Performed by: NURSE PRACTITIONER

## 2025-06-03 PROCEDURE — 99214 OFFICE O/P EST MOD 30 MIN: CPT | Mod: ,,, | Performed by: NURSE PRACTITIONER

## 2025-06-03 RX ORDER — CEPHALEXIN 500 MG/1
500 CAPSULE ORAL
COMMUNITY
Start: 2025-05-28 | End: 2025-06-04

## 2025-06-05 ENCOUNTER — OUTPATIENT CASE MANAGEMENT (OUTPATIENT)
Dept: ADMINISTRATIVE | Facility: OTHER | Age: 60
End: 2025-06-05
Payer: MEDICARE

## 2025-06-17 ENCOUNTER — OUTPATIENT CASE MANAGEMENT (OUTPATIENT)
Dept: ADMINISTRATIVE | Facility: OTHER | Age: 60
End: 2025-06-17
Payer: MEDICARE

## 2025-06-23 ENCOUNTER — TELEPHONE (OUTPATIENT)
Dept: FAMILY MEDICINE | Facility: CLINIC | Age: 60
End: 2025-06-23
Payer: MEDICARE

## 2025-06-23 DIAGNOSIS — R10.9 STOMACH CRAMPS: ICD-10-CM

## 2025-06-23 RX ORDER — DICYCLOMINE HYDROCHLORIDE 20 MG/1
20 TABLET ORAL DAILY
Qty: 90 TABLET | Refills: 1 | Status: SHIPPED | OUTPATIENT
Start: 2025-06-23

## 2025-06-23 NOTE — TELEPHONE ENCOUNTER
----- Message from Donnie Zavala sent at 6/23/2025 10:23 AM CDT -----  Vanessa Brannon 20mg      [facility]

## 2025-06-24 ENCOUNTER — OUTPATIENT CASE MANAGEMENT (OUTPATIENT)
Dept: ADMINISTRATIVE | Facility: OTHER | Age: 60
End: 2025-06-24
Payer: MEDICARE

## 2025-06-24 NOTE — PROGRESS NOTES
6-24-25  Unsuccessful Reach out #3, No answer, Chart closed  6-17-25  UTR #2, UTR letter via mail  6-16-25  UTR #1, No answer

## 2025-06-30 ENCOUNTER — TELEPHONE (OUTPATIENT)
Dept: FAMILY MEDICINE | Facility: CLINIC | Age: 60
End: 2025-06-30
Payer: MEDICARE

## 2025-06-30 DIAGNOSIS — M79.2 NERVE PAIN: ICD-10-CM

## 2025-06-30 RX ORDER — GABAPENTIN 400 MG/1
CAPSULE ORAL
Qty: 90 CAPSULE | Refills: 1 | Status: SHIPPED | OUTPATIENT
Start: 2025-06-30

## 2025-06-30 NOTE — TELEPHONE ENCOUNTER
----- Message from Lesvia sent at 6/30/2025  7:31 AM CDT -----  Patient needs refill on Gabapentin 400 mg called in to [facility]    used

## 2025-07-14 ENCOUNTER — TELEPHONE (OUTPATIENT)
Dept: FAMILY MEDICINE | Facility: CLINIC | Age: 60
End: 2025-07-14
Payer: MEDICARE

## 2025-07-14 DIAGNOSIS — G40.409 OTHER GENERALIZED EPILEPSY, NOT INTRACTABLE, WITHOUT STATUS EPILEPTICUS: ICD-10-CM

## 2025-07-14 RX ORDER — LEVETIRACETAM 500 MG/1
TABLET ORAL
Qty: 270 TABLET | Refills: 0 | Status: SHIPPED | OUTPATIENT
Start: 2025-07-14

## 2025-07-14 NOTE — TELEPHONE ENCOUNTER
----- Message from Donnie Zavala sent at 7/14/2025  8:00 AM CDT -----  Please send refill on keppra to [facility]

## 2025-08-18 ENCOUNTER — TELEPHONE (OUTPATIENT)
Dept: FAMILY MEDICINE | Facility: CLINIC | Age: 60
End: 2025-08-18
Payer: MEDICARE

## 2025-08-18 DIAGNOSIS — R10.9 STOMACH CRAMPS: ICD-10-CM

## 2025-08-18 DIAGNOSIS — J30.2 SEASONAL ALLERGIES: ICD-10-CM

## 2025-08-18 RX ORDER — DICYCLOMINE HYDROCHLORIDE 20 MG/1
20 TABLET ORAL DAILY
Qty: 90 TABLET | Refills: 1 | Status: SHIPPED | OUTPATIENT
Start: 2025-08-18 | End: 2025-08-18

## 2025-08-18 RX ORDER — CETIRIZINE HYDROCHLORIDE 10 MG/1
TABLET ORAL
Qty: 90 TABLET | Refills: 1 | Status: SHIPPED | OUTPATIENT
Start: 2025-08-18

## 2025-08-18 RX ORDER — DICYCLOMINE HYDROCHLORIDE 20 MG/1
20 TABLET ORAL 3 TIMES DAILY PRN
Qty: 90 TABLET | Refills: 1 | Status: SHIPPED | OUTPATIENT
Start: 2025-08-18

## 2025-08-25 ENCOUNTER — RESULTS FOLLOW-UP (OUTPATIENT)
Dept: FAMILY MEDICINE | Facility: CLINIC | Age: 60
End: 2025-08-25

## 2025-08-25 ENCOUNTER — OFFICE VISIT (OUTPATIENT)
Dept: FAMILY MEDICINE | Facility: CLINIC | Age: 60
End: 2025-08-25
Payer: MEDICARE

## 2025-08-25 VITALS
OXYGEN SATURATION: 96 % | HEIGHT: 69 IN | HEART RATE: 95 BPM | DIASTOLIC BLOOD PRESSURE: 62 MMHG | BODY MASS INDEX: 29.77 KG/M2 | TEMPERATURE: 98 F | WEIGHT: 201 LBS | SYSTOLIC BLOOD PRESSURE: 100 MMHG

## 2025-08-25 DIAGNOSIS — G40.909 SEIZURE DISORDER: Primary | ICD-10-CM

## 2025-08-25 DIAGNOSIS — E79.0 HYPERURICEMIA: ICD-10-CM

## 2025-08-25 DIAGNOSIS — E55.9 VITAMIN D DEFICIENCY: ICD-10-CM

## 2025-08-25 DIAGNOSIS — I10 BENIGN ESSENTIAL HTN: ICD-10-CM

## 2025-08-25 DIAGNOSIS — K59.09 CHRONIC CONSTIPATION: ICD-10-CM

## 2025-08-25 DIAGNOSIS — G81.91 HEMIPLEGIA AFFECTING RIGHT DOMINANT SIDE, UNSPECIFIED ETIOLOGY, UNSPECIFIED HEMIPLEGIA TYPE: ICD-10-CM

## 2025-08-25 DIAGNOSIS — E78.2 MIXED HYPERLIPIDEMIA: ICD-10-CM

## 2025-08-25 LAB
25(OH)D3+25(OH)D2 SERPL-MCNC: 23 NG/ML (ref 30–80)
ALBUMIN SERPL-MCNC: 4.2 G/DL (ref 3.5–5)
ALBUMIN/GLOB SERPL: 1.4 RATIO (ref 1.1–2)
ALP SERPL-CCNC: 91 UNIT/L (ref 40–150)
ALT SERPL-CCNC: 8 UNIT/L (ref 0–55)
ANION GAP SERPL CALC-SCNC: 10 MEQ/L
AST SERPL-CCNC: 9 UNIT/L (ref 11–45)
BASOPHILS # BLD AUTO: 0.07 X10(3)/MCL (ref 0.01–0.08)
BASOPHILS NFR BLD AUTO: 1.2 % (ref 0.1–1.2)
BILIRUB SERPL-MCNC: 0.2 MG/DL
BUN SERPL-MCNC: 9 MG/DL (ref 8.4–25.7)
CALCIUM SERPL-MCNC: 9.7 MG/DL (ref 8.4–10.2)
CHLORIDE SERPL-SCNC: 116 MMOL/L (ref 98–107)
CHOLEST SERPL-MCNC: 244 MG/DL
CHOLEST/HDLC SERPL: 8 {RATIO} (ref 0–5)
CO2 SERPL-SCNC: 15 MMOL/L (ref 22–29)
CREAT SERPL-MCNC: 1.15 MG/DL (ref 0.72–1.25)
CREAT/UREA NIT SERPL: 8
EOSINOPHIL # BLD AUTO: 0.49 X10(3)/MCL (ref 0.04–0.54)
EOSINOPHIL NFR BLD AUTO: 8.7 % (ref 0.7–7)
ERYTHROCYTE [DISTWIDTH] IN BLOOD BY AUTOMATED COUNT: 13.7 %
GFR SERPLBLD CREATININE-BSD FMLA CKD-EPI: 74 ML/MIN/1.73/M2
GLOBULIN SER-MCNC: 3 GM/DL (ref 2.4–3.5)
GLUCOSE SERPL-MCNC: 92 MG/DL (ref 74–100)
HCT VFR BLD AUTO: 37 % (ref 36–52)
HDLC SERPL-MCNC: 30 MG/DL (ref 35–60)
HGB BLD-MCNC: 12.8 G/DL (ref 13–18)
IMM GRANULOCYTES # BLD AUTO: 0.04 X10(3)/MCL (ref 0–0.03)
IMM GRANULOCYTES NFR BLD AUTO: 0.7 % (ref 0–0.5)
LDLC SERPL CALC-MCNC: 158 MG/DL (ref 50–140)
LYMPHOCYTES # BLD AUTO: 2 X10(3)/MCL (ref 1.32–3.57)
LYMPHOCYTES NFR BLD AUTO: 35.5 % (ref 20–55)
MCH RBC QN AUTO: 32.9 PG (ref 27–34)
MCHC RBC AUTO-ENTMCNC: 34.6 G/DL (ref 31–37)
MCV RBC AUTO: 95.1 FL (ref 79–99)
MONOCYTES # BLD AUTO: 0.35 X10(3)/MCL (ref 0.3–0.82)
MONOCYTES NFR BLD AUTO: 6.2 % (ref 4.7–12.5)
NEUTROPHILS # BLD AUTO: 2.68 X10(3)/MCL (ref 1.78–5.38)
NEUTROPHILS NFR BLD AUTO: 47.7 % (ref 37–73)
NRBC BLD AUTO-RTO: 0 %
PLATELET # BLD AUTO: 268 X10(3)/MCL (ref 140–371)
PMV BLD AUTO: 10.7 FL (ref 9.4–12.4)
POTASSIUM SERPL-SCNC: 3.9 MMOL/L (ref 3.5–5.1)
PROT SERPL-MCNC: 7.2 GM/DL (ref 6.4–8.3)
RBC # BLD AUTO: 3.89 X10(6)/MCL (ref 4–6)
SODIUM SERPL-SCNC: 141 MMOL/L (ref 136–145)
TRIGL SERPL-MCNC: 280 MG/DL (ref 34–140)
VLDLC SERPL CALC-MCNC: 56 MG/DL
WBC # BLD AUTO: 5.63 X10(3)/MCL (ref 4–11.5)

## 2025-08-25 PROCEDURE — 84550 ASSAY OF BLOOD/URIC ACID: CPT | Performed by: NURSE PRACTITIONER

## 2025-08-25 PROCEDURE — 85025 COMPLETE CBC W/AUTO DIFF WBC: CPT | Performed by: NURSE PRACTITIONER

## 2025-08-25 PROCEDURE — 82306 VITAMIN D 25 HYDROXY: CPT | Performed by: NURSE PRACTITIONER

## 2025-08-25 PROCEDURE — 80053 COMPREHEN METABOLIC PANEL: CPT | Performed by: NURSE PRACTITIONER

## 2025-08-25 PROCEDURE — 99214 OFFICE O/P EST MOD 30 MIN: CPT | Mod: ,,, | Performed by: NURSE PRACTITIONER

## 2025-08-25 PROCEDURE — 80061 LIPID PANEL: CPT | Performed by: NURSE PRACTITIONER

## 2025-08-25 PROCEDURE — 80177 DRUG SCRN QUAN LEVETIRACETAM: CPT | Performed by: NURSE PRACTITIONER

## 2025-08-26 ENCOUNTER — TELEPHONE (OUTPATIENT)
Dept: FAMILY MEDICINE | Facility: CLINIC | Age: 60
End: 2025-08-26
Payer: MEDICARE

## 2025-08-26 DIAGNOSIS — K22.70 BARRETT'S ESOPHAGUS WITHOUT DYSPLASIA: ICD-10-CM

## 2025-08-26 DIAGNOSIS — E79.0 HYPERURICEMIA: Primary | ICD-10-CM

## 2025-08-26 LAB
LEVETIRACETAM SERPL-MCNC: 35.4 MCG/ML (ref 10–40)
URATE SERPL-MCNC: 6 MG/DL (ref 3.5–7.2)

## 2025-08-26 RX ORDER — PANTOPRAZOLE SODIUM 40 MG/1
40 TABLET, DELAYED RELEASE ORAL DAILY
Qty: 30 TABLET | Refills: 2 | Status: SHIPPED | OUTPATIENT
Start: 2025-08-26

## 2025-09-02 ENCOUNTER — TELEPHONE (OUTPATIENT)
Dept: FAMILY MEDICINE | Facility: CLINIC | Age: 60
End: 2025-09-02
Payer: MEDICARE

## 2025-09-02 DIAGNOSIS — I70.0 AORTIC ATHEROSCLEROSIS: ICD-10-CM

## 2025-09-02 DIAGNOSIS — M79.2 NERVE PAIN: ICD-10-CM

## 2025-09-02 RX ORDER — GABAPENTIN 400 MG/1
CAPSULE ORAL
Qty: 90 CAPSULE | Refills: 1 | Status: SHIPPED | OUTPATIENT
Start: 2025-09-02

## 2025-09-02 RX ORDER — CLOPIDOGREL BISULFATE 75 MG/1
TABLET ORAL
Qty: 90 TABLET | Refills: 1 | Status: SHIPPED | OUTPATIENT
Start: 2025-09-02